# Patient Record
Sex: FEMALE | Race: BLACK OR AFRICAN AMERICAN | Employment: UNEMPLOYED | URBAN - METROPOLITAN AREA
[De-identification: names, ages, dates, MRNs, and addresses within clinical notes are randomized per-mention and may not be internally consistent; named-entity substitution may affect disease eponyms.]

---

## 2018-01-30 ENCOUNTER — HOSPITAL ENCOUNTER (EMERGENCY)
Age: 43
Discharge: HOME | End: 2018-01-30
Attending: EMERGENCY MEDICINE
Payer: COMMERCIAL

## 2018-01-30 VITALS
WEIGHT: 230 LBS | TEMPERATURE: 97.3 F | OXYGEN SATURATION: 98 % | HEART RATE: 89 BPM | DIASTOLIC BLOOD PRESSURE: 97 MMHG | RESPIRATION RATE: 14 BRPM | SYSTOLIC BLOOD PRESSURE: 151 MMHG

## 2018-01-30 DIAGNOSIS — K02.9 DENTAL CARIES: Primary | ICD-10-CM

## 2018-01-30 PROCEDURE — 6370000000 HC RX 637 (ALT 250 FOR IP): Performed by: EMERGENCY MEDICINE

## 2018-01-30 PROCEDURE — 99283 EMERGENCY DEPT VISIT LOW MDM: CPT

## 2018-01-30 RX ORDER — IBUPROFEN 800 MG/1
800 TABLET ORAL EVERY 8 HOURS PRN
Qty: 30 TABLET | Refills: 0 | Status: SHIPPED | OUTPATIENT
Start: 2018-01-30

## 2018-01-30 RX ORDER — IBUPROFEN 800 MG/1
800 TABLET ORAL ONCE
Status: COMPLETED | OUTPATIENT
Start: 2018-01-30 | End: 2018-01-30

## 2018-01-30 RX ORDER — CLINDAMYCIN HYDROCHLORIDE 150 MG/1
450 CAPSULE ORAL 3 TIMES DAILY
Qty: 90 CAPSULE | Refills: 0 | Status: SHIPPED | OUTPATIENT
Start: 2018-01-30 | End: 2018-02-09

## 2018-01-30 RX ORDER — CLINDAMYCIN HYDROCHLORIDE 150 MG/1
450 CAPSULE ORAL ONCE
Status: COMPLETED | OUTPATIENT
Start: 2018-01-30 | End: 2018-01-30

## 2018-01-30 RX ADMIN — CLINDAMYCIN HYDROCHLORIDE 450 MG: 150 CAPSULE ORAL at 09:44

## 2018-01-30 RX ADMIN — IBUPROFEN 800 MG: 800 TABLET, FILM COATED ORAL at 09:44

## 2018-01-30 ASSESSMENT — PAIN DESCRIPTION - LOCATION: LOCATION: FACE

## 2018-01-30 ASSESSMENT — ENCOUNTER SYMPTOMS
RHINORRHEA: 0
SHORTNESS OF BREATH: 0
TROUBLE SWALLOWING: 0
SINUS PAIN: 0
SORE THROAT: 0
SINUS PRESSURE: 0
FACIAL SWELLING: 1
VOICE CHANGE: 0
ABDOMINAL PAIN: 0

## 2018-01-30 ASSESSMENT — PAIN DESCRIPTION - PAIN TYPE: TYPE: ACUTE PAIN

## 2018-01-30 ASSESSMENT — PAIN DESCRIPTION - DESCRIPTORS: DESCRIPTORS: ACHING

## 2018-01-30 ASSESSMENT — PAIN SCALES - GENERAL
PAINLEVEL_OUTOF10: 4
PAINLEVEL_OUTOF10: 4

## 2018-01-30 ASSESSMENT — PAIN DESCRIPTION - ORIENTATION: ORIENTATION: LEFT

## 2018-01-30 ASSESSMENT — PAIN DESCRIPTION - ONSET: ONSET: SUDDEN

## 2018-01-30 NOTE — ED PROVIDER NOTES
No narrative on file       History reviewed. No pertinent family history. Allergies:  Penicillins    Home Medications:  Prior to Admission medications    Medication Sig Start Date End Date Taking? Authorizing Provider   clindamycin (CLEOCIN) 150 MG capsule Take 3 capsules by mouth 3 times daily for 10 days 1/30/18 2/9/18 Yes Irma Gong MD   ibuprofen (ADVIL;MOTRIN) 800 MG tablet Take 1 tablet by mouth every 8 hours as needed for Pain 1/30/18  Yes Irma Gong MD       REVIEW OF SYSTEMS    (2-9 systems for level 4, 10 or more for level 5)      Review of Systems   Constitutional: Negative for chills and fever. HENT: Positive for dental problem and facial swelling. Negative for congestion, drooling, rhinorrhea, sinus pain, sinus pressure, sore throat, trouble swallowing and voice change. Eyes: Negative for visual disturbance. Respiratory: Negative for shortness of breath. Cardiovascular: Negative for chest pain. Gastrointestinal: Negative for abdominal pain. Genitourinary: Negative for dysuria and frequency. Musculoskeletal: Negative for arthralgias. Skin: Negative for wound. Neurological: Negative for dizziness and light-headedness. PHYSICAL EXAM   (up to 7 for level 4, 8 or more for level 5)      INITIAL VITALS:   BP (!) 151/97   Pulse 89   Temp 97.3 °F (36.3 °C) (Oral)   Resp 14   Wt 230 lb (104.3 kg)   LMP 01/15/2018   SpO2 98%     Physical Exam   Constitutional: She is oriented to person, place, and time. No distress. HENT:   Head: Normocephalic and atraumatic. Evaluation of the oral cavity with swelling of the gum noted around teeth 14-16 with the 16th molar cracked, no fluctuance, no drainage, no signs of dental abscess. No stridor, drooling, no enlarged tonsils   Eyes: Right eye exhibits no discharge. Left eye exhibits no discharge. Cardiovascular: Normal rate, regular rhythm and normal heart sounds. Exam reveals no friction rub.     No murmur heard.  Pulmonary/Chest: Effort normal and breath sounds normal. No stridor. No respiratory distress. She has no wheezes. She has no rales. She exhibits no tenderness. Abdominal: Soft. She exhibits no distension. There is no tenderness. There is no guarding. Neurological: She is alert and oriented to person, place, and time. Skin: Skin is warm and dry. She is not diaphoretic. Nursing note and vitals reviewed. DIFFERENTIAL  DIAGNOSIS     PLAN (LABS / IMAGING / EKG):  No orders of the defined types were placed in this encounter. MEDICATIONS ORDERED:  Orders Placed This Encounter   Medications    clindamycin (CLEOCIN) capsule 450 mg    ibuprofen (ADVIL;MOTRIN) tablet 800 mg    clindamycin (CLEOCIN) 150 MG capsule     Sig: Take 3 capsules by mouth 3 times daily for 10 days     Dispense:  90 capsule     Refill:  0    ibuprofen (ADVIL;MOTRIN) 800 MG tablet     Sig: Take 1 tablet by mouth every 8 hours as needed for Pain     Dispense:  30 tablet     Refill:  0     DIAGNOSTIC RESULTS / EMERGENCY DEPARTMENT COURSE / MDM     LABS:  No results found for this visit on 01/30/18. EMERGENCY DEPARTMENT COURSE:    MDM: Patient with no signs of a dental abscess requiring drainage. We'll start on antibiotics and follow-up with scheduled dental appointment. I discussed his symptoms significantly worsen to return for further evaluation. PROCEDURES:  None    CONSULTS:  None    FINAL IMPRESSION      1.  Dental caries          DISPOSITION / PLAN     DISPOSITION Decision To Discharge 01/30/2018 09:41:33 AM      PATIENT REFERRED TO:  [facility] ED  [address] [place] [address]  [phone]    If symptoms worsen      DISCHARGE MEDICATIONS:  Discharge Medication List as of 1/30/2018  9:41 AM      START taking these medications    Details   clindamycin (CLEOCIN) 150 MG capsule Take 3 capsules by mouth 3 times daily for 10 days, Disp-90 capsule, R-0Print      ibuprofen (ADVIL;MOTRIN) 800 MG

## 2018-01-30 NOTE — ED NOTES
[facility]  [address]  Phone: [phone]  Fax: [phone]    Patient Appointment Information    To schedule an appointment at the [facility] for a patient please call:    [phone] for [address] / [phone] for Adults    Appointments for children are available the day the call is placed. Adult appointments are generally available within 48 to 72 hours. Information required making an appointment:    Amelia Noe  36 y.o. 1975 [phone] (home)    Chief Complaint   Patient presents with    Facial Swelling     pt awoke today with left side facial swelling. pt states that she has a hx of dental abscesses. pt has a dental appointment friday but states she was concerned waiting that long. Appointment day and time: Thursday February 1st at 09:45 am    Please as the patient to bring Medicaid card, Medicaid HMO card, or other insurance card. For self-pay, cost of emergency appointment is $38 for adults or $25 for children age 21 and under. The [facility] Center is not a free clinic and fees are due at time of service.       Signature:  Lisa Verduzco Date:  1/30/18     Peggy Medina RN  01/30/18 [id number]

## 2023-02-16 ENCOUNTER — HOSPITAL ENCOUNTER (INPATIENT)
Age: 48
LOS: 3 days | Discharge: HOME | DRG: 024 | End: 2023-02-20
Attending: EMERGENCY MEDICINE | Admitting: PSYCHIATRY & NEUROLOGY
Payer: COMMERCIAL

## 2023-02-16 ENCOUNTER — APPOINTMENT (OUTPATIENT)
Dept: CT IMAGING | Age: 48
DRG: 024 | End: 2023-02-16
Payer: COMMERCIAL

## 2023-02-16 DIAGNOSIS — I63.50 CEREBROVASCULAR ACCIDENT (CVA) DUE TO OCCLUSION OF CEREBRAL ARTERY (HCC): Primary | ICD-10-CM

## 2023-02-16 LAB
ANION GAP: 13 MMOL/L (ref 7–16)
EGFR, POC: >60 ML/MIN/1.73M2
GLUCOSE BLD-MCNC: 115 MG/DL (ref 74–100)
HCO3 VENOUS: 25.2 MMOL/L (ref 22–29)
NEGATIVE BASE EXCESS, VEN: 1 (ref 0–2)
O2 SAT, VEN: 32 % (ref 60–85)
PCO2, VEN: 45.5 MM HG (ref 41–51)
PH VENOUS: 7.35 (ref 7.32–7.43)
PO2, VEN: 21.1 MM HG (ref 30–50)
POC BUN: 25 MG/DL (ref 8–26)
POC CHLORIDE: 102 MMOL/L (ref 98–107)
POC CREATININE: 0.97 MG/DL (ref 0.51–1.19)
POC HEMATOCRIT: 42 % (ref 36–46)
POC HEMOGLOBIN: 14.3 G/DL (ref 12–16)
POC IONIZED CALCIUM: 1.2 MMOL/L (ref 1.15–1.33)
POC LACTIC ACID: 0.95 MMOL/L (ref 0.56–1.39)
POC POTASSIUM: 4.4 MMOL/L (ref 3.5–4.5)
POC SODIUM: 138 MMOL/L (ref 138–146)
POC TCO2: 25 MMOL/L (ref 22–30)

## 2023-02-16 PROCEDURE — 83874 ASSAY OF MYOGLOBIN: CPT

## 2023-02-16 PROCEDURE — 6360000004 HC RX CONTRAST MEDICATION: Performed by: STUDENT IN AN ORGANIZED HEALTH CARE EDUCATION/TRAINING PROGRAM

## 2023-02-16 PROCEDURE — 70498 CT ANGIOGRAPHY NECK: CPT

## 2023-02-16 PROCEDURE — 85730 THROMBOPLASTIN TIME PARTIAL: CPT

## 2023-02-16 PROCEDURE — 85610 PROTHROMBIN TIME: CPT

## 2023-02-16 PROCEDURE — 80048 BASIC METABOLIC PNL TOTAL CA: CPT

## 2023-02-16 PROCEDURE — 99285 EMERGENCY DEPT VISIT HI MDM: CPT

## 2023-02-16 PROCEDURE — 82553 CREATINE MB FRACTION: CPT

## 2023-02-16 PROCEDURE — 82565 ASSAY OF CREATININE: CPT

## 2023-02-16 PROCEDURE — 82803 BLOOD GASES ANY COMBINATION: CPT

## 2023-02-16 PROCEDURE — 82947 ASSAY GLUCOSE BLOOD QUANT: CPT

## 2023-02-16 PROCEDURE — 85014 HEMATOCRIT: CPT

## 2023-02-16 PROCEDURE — 84520 ASSAY OF UREA NITROGEN: CPT

## 2023-02-16 PROCEDURE — 83605 ASSAY OF LACTIC ACID: CPT

## 2023-02-16 PROCEDURE — 82330 ASSAY OF CALCIUM: CPT

## 2023-02-16 PROCEDURE — 80051 ELECTROLYTE PANEL: CPT

## 2023-02-16 PROCEDURE — 70450 CT HEAD/BRAIN W/O DYE: CPT

## 2023-02-16 PROCEDURE — 96374 THER/PROPH/DIAG INJ IV PUSH: CPT

## 2023-02-16 PROCEDURE — 82550 ASSAY OF CK (CPK): CPT

## 2023-02-16 PROCEDURE — 84484 ASSAY OF TROPONIN QUANT: CPT

## 2023-02-16 PROCEDURE — 85025 COMPLETE CBC W/AUTO DIFF WBC: CPT

## 2023-02-16 RX ORDER — ONDANSETRON 2 MG/ML
INJECTION INTRAMUSCULAR; INTRAVENOUS
Status: COMPLETED
Start: 2023-02-16 | End: 2023-02-17

## 2023-02-16 RX ORDER — ONDANSETRON 2 MG/ML
4 INJECTION INTRAMUSCULAR; INTRAVENOUS ONCE
Status: COMPLETED | OUTPATIENT
Start: 2023-02-17 | End: 2023-02-17

## 2023-02-16 RX ADMIN — IOPAMIDOL 90 ML: 755 INJECTION, SOLUTION INTRAVENOUS at 23:44

## 2023-02-17 ENCOUNTER — ANESTHESIA (OUTPATIENT)
Dept: INTERVENTIONAL RADIOLOGY/VASCULAR | Age: 48
DRG: 024 | End: 2023-02-17
Payer: COMMERCIAL

## 2023-02-17 ENCOUNTER — APPOINTMENT (OUTPATIENT)
Dept: INTERVENTIONAL RADIOLOGY/VASCULAR | Age: 48
DRG: 024 | End: 2023-02-17
Payer: COMMERCIAL

## 2023-02-17 ENCOUNTER — APPOINTMENT (OUTPATIENT)
Dept: GENERAL RADIOLOGY | Age: 48
DRG: 024 | End: 2023-02-17
Payer: COMMERCIAL

## 2023-02-17 ENCOUNTER — APPOINTMENT (OUTPATIENT)
Dept: MRI IMAGING | Age: 48
DRG: 024 | End: 2023-02-17
Payer: COMMERCIAL

## 2023-02-17 ENCOUNTER — ANESTHESIA EVENT (OUTPATIENT)
Dept: INTERVENTIONAL RADIOLOGY/VASCULAR | Age: 48
DRG: 024 | End: 2023-02-17
Payer: COMMERCIAL

## 2023-02-17 PROBLEM — I63.9 CEREBROVASCULAR ACCIDENT ABORTED BY ADMINISTRATION OF THROMBOLYTIC AGENT (HCC): Status: ACTIVE | Noted: 2023-02-17

## 2023-02-17 PROBLEM — I63.511 ACUTE STROKE DUE TO OCCLUSION OF RIGHT MIDDLE CEREBRAL ARTERY (HCC): Status: ACTIVE | Noted: 2023-02-17

## 2023-02-17 PROBLEM — I63.50 CEREBROVASCULAR ACCIDENT (CVA) DUE TO OCCLUSION OF CEREBRAL ARTERY (HCC): Status: ACTIVE | Noted: 2023-02-17

## 2023-02-17 LAB
ABSOLUTE EOS #: 0.49 K/UL (ref 0–0.44)
ABSOLUTE IMMATURE GRANULOCYTE: <0.03 K/UL (ref 0–0.3)
ABSOLUTE LYMPH #: 2.3 K/UL (ref 1.1–3.7)
ABSOLUTE MONO #: 0.76 K/UL (ref 0.1–1.2)
ANION GAP SERPL CALCULATED.3IONS-SCNC: 14 MMOL/L (ref 9–17)
BASOPHILS # BLD: 1 % (ref 0–2)
BASOPHILS ABSOLUTE: 0.04 K/UL (ref 0–0.2)
BUN SERPL-MCNC: 21 MG/DL (ref 6–20)
CALCIUM SERPL-MCNC: 8.8 MG/DL (ref 8.6–10.4)
CHLORIDE SERPL-SCNC: 101 MMOL/L (ref 98–107)
CHOLEST SERPL-MCNC: 159 MG/DL
CHOLESTEROL/HDL RATIO: 4.1
CK SERPL-CCNC: 37 U/L (ref 26–192)
CO2 SERPL-SCNC: 20 MMOL/L (ref 20–31)
CREAT SERPL-MCNC: 0.97 MG/DL (ref 0.5–0.9)
EKG ATRIAL RATE: 100 BPM
EKG ATRIAL RATE: 98 BPM
EKG P AXIS: 57 DEGREES
EKG P AXIS: 69 DEGREES
EKG P-R INTERVAL: 144 MS
EKG P-R INTERVAL: 148 MS
EKG Q-T INTERVAL: 382 MS
EKG Q-T INTERVAL: 386 MS
EKG QRS DURATION: 106 MS
EKG QRS DURATION: 110 MS
EKG QTC CALCULATION (BAZETT): 487 MS
EKG QTC CALCULATION (BAZETT): 497 MS
EKG R AXIS: -3 DEGREES
EKG R AXIS: 5 DEGREES
EKG T AXIS: -56 DEGREES
EKG T AXIS: -82 DEGREES
EKG VENTRICULAR RATE: 100 BPM
EKG VENTRICULAR RATE: 98 BPM
EOSINOPHILS RELATIVE PERCENT: 6 % (ref 1–4)
EST. AVERAGE GLUCOSE BLD GHB EST-MCNC: 120 MG/DL
GFR SERPL CREATININE-BSD FRML MDRD: >60 ML/MIN/1.73M2
GLUCOSE SERPL-MCNC: 119 MG/DL (ref 70–99)
HBA1C MFR BLD: 5.8 % (ref 4–6)
HCT VFR BLD AUTO: 38.3 % (ref 36.3–47.1)
HCT VFR BLD AUTO: 38.9 % (ref 36.3–47.1)
HDLC SERPL-MCNC: 39 MG/DL
HGB BLD-MCNC: 12.4 G/DL (ref 11.9–15.1)
HGB BLD-MCNC: 12.6 G/DL (ref 11.9–15.1)
IMMATURE GRANULOCYTES: 0 %
INR PPP: 1
LDLC SERPL CALC-MCNC: 94 MG/DL (ref 0–130)
LYMPHOCYTES # BLD: 28 % (ref 24–43)
MCH RBC QN AUTO: 28 PG (ref 25.2–33.5)
MCH RBC QN AUTO: 28.1 PG (ref 25.2–33.5)
MCHC RBC AUTO-ENTMCNC: 32.4 G/DL (ref 28.4–34.8)
MCHC RBC AUTO-ENTMCNC: 32.4 G/DL (ref 28.4–34.8)
MCV RBC AUTO: 86.5 FL (ref 82.6–102.9)
MCV RBC AUTO: 86.6 FL (ref 82.6–102.9)
MONOCYTES # BLD: 9 % (ref 3–12)
MYOGLOBIN SERPL-MCNC: 23 NG/ML (ref 25–58)
NRBC AUTOMATED: 0 PER 100 WBC
NRBC AUTOMATED: 0 PER 100 WBC
PARTIAL THROMBOPLASTIN TIME: 22.6 SEC (ref 20.5–30.5)
PDW BLD-RTO: 15.5 % (ref 11.8–14.4)
PDW BLD-RTO: 15.6 % (ref 11.8–14.4)
PLATELET # BLD AUTO: 301 K/UL (ref 138–453)
PLATELET # BLD AUTO: 341 K/UL (ref 138–453)
PMV BLD AUTO: 9.7 FL (ref 8.1–13.5)
PMV BLD AUTO: 9.7 FL (ref 8.1–13.5)
POTASSIUM SERPL-SCNC: 4.5 MMOL/L (ref 3.7–5.3)
PROTHROMBIN TIME: 10.4 SEC (ref 9.1–12.3)
RBC # BLD: 4.43 M/UL (ref 3.95–5.11)
RBC # BLD: 4.49 M/UL (ref 3.95–5.11)
RBC # BLD: ABNORMAL 10*6/UL
REASON FOR REJECTION: NORMAL
SEG NEUTROPHILS: 56 % (ref 36–65)
SEGMENTED NEUTROPHILS ABSOLUTE COUNT: 4.69 K/UL (ref 1.5–8.1)
SODIUM SERPL-SCNC: 135 MMOL/L (ref 135–144)
TRIGL SERPL-MCNC: 131 MG/DL
TROPONIN I SERPL DL<=0.01 NG/ML-MCNC: 12 NG/L (ref 0–14)
WBC # BLD AUTO: 8.3 K/UL (ref 3.5–11.3)
WBC # BLD AUTO: 9.8 K/UL (ref 3.5–11.3)
ZZ NTE CLEAN UP: ORDERED TEST: NORMAL
ZZ NTE WITH NAME CLEAN UP: SPECIMEN SOURCE: NORMAL

## 2023-02-17 PROCEDURE — C1760 CLOSURE DEV, VASC: HCPCS

## 2023-02-17 PROCEDURE — 93010 ELECTROCARDIOGRAM REPORT: CPT | Performed by: INTERNAL MEDICINE

## 2023-02-17 PROCEDURE — 80061 LIPID PANEL: CPT

## 2023-02-17 PROCEDURE — 6360000002 HC RX W HCPCS

## 2023-02-17 PROCEDURE — C1757 CATH, THROMBECTOMY/EMBOLECT: HCPCS

## 2023-02-17 PROCEDURE — 93005 ELECTROCARDIOGRAM TRACING: CPT | Performed by: STUDENT IN AN ORGANIZED HEALTH CARE EDUCATION/TRAINING PROGRAM

## 2023-02-17 PROCEDURE — 71045 X-RAY EXAM CHEST 1 VIEW: CPT

## 2023-02-17 PROCEDURE — 6370000000 HC RX 637 (ALT 250 FOR IP): Performed by: NURSE PRACTITIONER

## 2023-02-17 PROCEDURE — C1887 CATHETER, GUIDING: HCPCS

## 2023-02-17 PROCEDURE — 2580000003 HC RX 258: Performed by: SPECIALIST

## 2023-02-17 PROCEDURE — 6360000002 HC RX W HCPCS: Performed by: SPECIALIST

## 2023-02-17 PROCEDURE — 6360000004 HC RX CONTRAST MEDICATION: Performed by: PSYCHIATRY & NEUROLOGY

## 2023-02-17 PROCEDURE — 6370000000 HC RX 637 (ALT 250 FOR IP): Performed by: STUDENT IN AN ORGANIZED HEALTH CARE EDUCATION/TRAINING PROGRAM

## 2023-02-17 PROCEDURE — 70547 MR ANGIOGRAPHY NECK W/O DYE: CPT

## 2023-02-17 PROCEDURE — 83036 HEMOGLOBIN GLYCOSYLATED A1C: CPT

## 2023-02-17 PROCEDURE — 92523 SPEECH SOUND LANG COMPREHEN: CPT

## 2023-02-17 PROCEDURE — 03CG3Z7 EXTIRPATION OF MATTER FROM INTRACRANIAL ARTERY USING STENT RETRIEVER, PERCUTANEOUS APPROACH: ICD-10-PCS | Performed by: PSYCHIATRY & NEUROLOGY

## 2023-02-17 PROCEDURE — 6360000002 HC RX W HCPCS: Performed by: STUDENT IN AN ORGANIZED HEALTH CARE EDUCATION/TRAINING PROGRAM

## 2023-02-17 PROCEDURE — 2580000003 HC RX 258: Performed by: STUDENT IN AN ORGANIZED HEALTH CARE EDUCATION/TRAINING PROGRAM

## 2023-02-17 PROCEDURE — 2709999900 HC NON-CHARGEABLE SUPPLY

## 2023-02-17 PROCEDURE — 93005 ELECTROCARDIOGRAM TRACING: CPT | Performed by: PSYCHIATRY & NEUROLOGY

## 2023-02-17 PROCEDURE — 36415 COLL VENOUS BLD VENIPUNCTURE: CPT

## 2023-02-17 PROCEDURE — C1894 INTRO/SHEATH, NON-LASER: HCPCS

## 2023-02-17 PROCEDURE — 99223 1ST HOSP IP/OBS HIGH 75: CPT | Performed by: PSYCHIATRY & NEUROLOGY

## 2023-02-17 PROCEDURE — 85027 COMPLETE CBC AUTOMATED: CPT

## 2023-02-17 PROCEDURE — 61645 PERQ ART M-THROMBECT &/NFS: CPT | Performed by: PSYCHIATRY & NEUROLOGY

## 2023-02-17 PROCEDURE — 2000000000 HC ICU R&B

## 2023-02-17 PROCEDURE — C1769 GUIDE WIRE: HCPCS

## 2023-02-17 PROCEDURE — 99291 CRITICAL CARE FIRST HOUR: CPT | Performed by: PSYCHIATRY & NEUROLOGY

## 2023-02-17 PROCEDURE — 70551 MRI BRAIN STEM W/O DYE: CPT

## 2023-02-17 PROCEDURE — 3E03317 INTRODUCTION OF OTHER THROMBOLYTIC INTO PERIPHERAL VEIN, PERCUTANEOUS APPROACH: ICD-10-PCS | Performed by: PSYCHIATRY & NEUROLOGY

## 2023-02-17 PROCEDURE — 2500000003 HC RX 250 WO HCPCS: Performed by: SPECIALIST

## 2023-02-17 PROCEDURE — 99221 1ST HOSP IP/OBS SF/LOW 40: CPT | Performed by: PHYSICAL MEDICINE & REHABILITATION

## 2023-02-17 PROCEDURE — 6370000000 HC RX 637 (ALT 250 FOR IP)

## 2023-02-17 PROCEDURE — 61645 PERQ ART M-THROMBECT &/NFS: CPT

## 2023-02-17 RX ORDER — ATORVASTATIN CALCIUM 80 MG/1
80 TABLET, FILM COATED ORAL NIGHTLY
Status: DISCONTINUED | OUTPATIENT
Start: 2023-02-17 | End: 2023-02-20 | Stop reason: HOSPADM

## 2023-02-17 RX ORDER — SODIUM CHLORIDE 0.9 % (FLUSH) 0.9 %
5-40 SYRINGE (ML) INJECTION PRN
Status: DISCONTINUED | OUTPATIENT
Start: 2023-02-17 | End: 2023-02-20 | Stop reason: HOSPADM

## 2023-02-17 RX ORDER — SODIUM CHLORIDE 9 MG/ML
INJECTION, SOLUTION INTRAVENOUS CONTINUOUS
Status: DISCONTINUED | OUTPATIENT
Start: 2023-02-17 | End: 2023-02-17

## 2023-02-17 RX ORDER — POLYETHYLENE GLYCOL 3350 17 G/17G
17 POWDER, FOR SOLUTION ORAL DAILY PRN
Status: DISCONTINUED | OUTPATIENT
Start: 2023-02-17 | End: 2023-02-20 | Stop reason: HOSPADM

## 2023-02-17 RX ORDER — CARVEDILOL 3.12 MG/1
3.12 TABLET ORAL 2 TIMES DAILY WITH MEALS
Status: DISCONTINUED | OUTPATIENT
Start: 2023-02-17 | End: 2023-02-20 | Stop reason: HOSPADM

## 2023-02-17 RX ORDER — MAGNESIUM OXIDE 400 MG/1
400 TABLET ORAL DAILY
COMMUNITY
Start: 2023-02-16

## 2023-02-17 RX ORDER — ALBUTEROL SULFATE 90 UG/1
2 AEROSOL, METERED RESPIRATORY (INHALATION) EVERY 6 HOURS PRN
COMMUNITY
Start: 2022-04-22

## 2023-02-17 RX ORDER — ONDANSETRON 4 MG/1
4 TABLET, ORALLY DISINTEGRATING ORAL EVERY 8 HOURS PRN
Status: DISCONTINUED | OUTPATIENT
Start: 2023-02-17 | End: 2023-02-20 | Stop reason: HOSPADM

## 2023-02-17 RX ORDER — ASPIRIN 300 MG/1
300 SUPPOSITORY RECTAL DAILY
Status: DISCONTINUED | OUTPATIENT
Start: 2023-02-18 | End: 2023-02-17

## 2023-02-17 RX ORDER — SPIRONOLACTONE 25 MG/1
25 TABLET ORAL DAILY
Status: DISCONTINUED | OUTPATIENT
Start: 2023-02-17 | End: 2023-02-20 | Stop reason: HOSPADM

## 2023-02-17 RX ORDER — MIDAZOLAM HYDROCHLORIDE 1 MG/ML
INJECTION INTRAMUSCULAR; INTRAVENOUS PRN
Status: DISCONTINUED | OUTPATIENT
Start: 2023-02-17 | End: 2023-02-17 | Stop reason: SDUPTHER

## 2023-02-17 RX ORDER — ENOXAPARIN SODIUM 100 MG/ML
40 INJECTION SUBCUTANEOUS DAILY
Status: DISCONTINUED | OUTPATIENT
Start: 2023-02-18 | End: 2023-02-17

## 2023-02-17 RX ORDER — ONDANSETRON 4 MG/1
4 TABLET, ORALLY DISINTEGRATING ORAL EVERY 8 HOURS PRN
Status: DISCONTINUED | OUTPATIENT
Start: 2023-02-17 | End: 2023-02-17 | Stop reason: SDUPTHER

## 2023-02-17 RX ORDER — ASPIRIN 81 MG/1
81 TABLET ORAL DAILY
Status: DISCONTINUED | OUTPATIENT
Start: 2023-02-18 | End: 2023-02-17

## 2023-02-17 RX ORDER — CLINDAMYCIN PHOSPHATE 600 MG/50ML
INJECTION INTRAVENOUS PRN
Status: DISCONTINUED | OUTPATIENT
Start: 2023-02-17 | End: 2023-02-17 | Stop reason: SDUPTHER

## 2023-02-17 RX ORDER — ONDANSETRON 2 MG/ML
4 INJECTION INTRAMUSCULAR; INTRAVENOUS EVERY 6 HOURS PRN
Status: DISCONTINUED | OUTPATIENT
Start: 2023-02-17 | End: 2023-02-20 | Stop reason: HOSPADM

## 2023-02-17 RX ORDER — IODIXANOL 320 MG/ML
100 INJECTION, SOLUTION INTRAVASCULAR
Status: COMPLETED | OUTPATIENT
Start: 2023-02-17 | End: 2023-02-17

## 2023-02-17 RX ORDER — ONDANSETRON 2 MG/ML
4 INJECTION INTRAMUSCULAR; INTRAVENOUS EVERY 6 HOURS PRN
Status: DISCONTINUED | OUTPATIENT
Start: 2023-02-17 | End: 2023-02-17 | Stop reason: SDUPTHER

## 2023-02-17 RX ORDER — FENTANYL CITRATE 50 UG/ML
INJECTION, SOLUTION INTRAMUSCULAR; INTRAVENOUS PRN
Status: DISCONTINUED | OUTPATIENT
Start: 2023-02-17 | End: 2023-02-17 | Stop reason: SDUPTHER

## 2023-02-17 RX ORDER — SODIUM CHLORIDE 9 MG/ML
INJECTION, SOLUTION INTRAVENOUS PRN
Status: DISCONTINUED | OUTPATIENT
Start: 2023-02-17 | End: 2023-02-20 | Stop reason: HOSPADM

## 2023-02-17 RX ORDER — SPIRONOLACTONE 25 MG/1
25 TABLET ORAL DAILY
COMMUNITY
Start: 2022-02-04

## 2023-02-17 RX ORDER — ASPIRIN 81 MG/1
81 TABLET, CHEWABLE ORAL DAILY
Status: DISCONTINUED | OUTPATIENT
Start: 2023-02-18 | End: 2023-02-20 | Stop reason: HOSPADM

## 2023-02-17 RX ORDER — FUROSEMIDE 40 MG/1
40 TABLET ORAL DAILY
COMMUNITY
Start: 2022-09-05

## 2023-02-17 RX ORDER — SODIUM CHLORIDE 9 MG/ML
INJECTION, SOLUTION INTRAVENOUS CONTINUOUS PRN
Status: DISCONTINUED | OUTPATIENT
Start: 2023-02-17 | End: 2023-02-17 | Stop reason: SDUPTHER

## 2023-02-17 RX ORDER — 0.9 % SODIUM CHLORIDE 0.9 %
500 INTRAVENOUS SOLUTION INTRAVENOUS ONCE
Status: DISCONTINUED | OUTPATIENT
Start: 2023-02-17 | End: 2023-02-17

## 2023-02-17 RX ORDER — CARVEDILOL 3.12 MG/1
3.12 TABLET ORAL 2 TIMES DAILY
COMMUNITY

## 2023-02-17 RX ORDER — LABETALOL HYDROCHLORIDE 5 MG/ML
10 INJECTION, SOLUTION INTRAVENOUS EVERY 10 MIN PRN
Status: DISCONTINUED | OUTPATIENT
Start: 2023-02-17 | End: 2023-02-17

## 2023-02-17 RX ORDER — ACETAMINOPHEN 325 MG/1
650 TABLET ORAL EVERY 4 HOURS PRN
Status: DISCONTINUED | OUTPATIENT
Start: 2023-02-17 | End: 2023-02-20 | Stop reason: HOSPADM

## 2023-02-17 RX ORDER — SODIUM CHLORIDE 0.9 % (FLUSH) 0.9 %
5-40 SYRINGE (ML) INJECTION EVERY 12 HOURS SCHEDULED
Status: DISCONTINUED | OUTPATIENT
Start: 2023-02-17 | End: 2023-02-20 | Stop reason: HOSPADM

## 2023-02-17 RX ORDER — ALBUTEROL SULFATE 2.5 MG/3ML
2.5 SOLUTION RESPIRATORY (INHALATION) EVERY 6 HOURS
COMMUNITY
Start: 2023-01-28

## 2023-02-17 RX ORDER — FUROSEMIDE 20 MG/1
20 TABLET ORAL DAILY
Status: DISCONTINUED | OUTPATIENT
Start: 2023-02-17 | End: 2023-02-20 | Stop reason: HOSPADM

## 2023-02-17 RX ADMIN — SODIUM CHLORIDE: 9 INJECTION, SOLUTION INTRAVENOUS at 03:20

## 2023-02-17 RX ADMIN — CLINDAMYCIN PHOSPHATE 600 MG: 600 INJECTION, SOLUTION INTRAVENOUS at 03:36

## 2023-02-17 RX ADMIN — FUROSEMIDE 20 MG: 20 TABLET ORAL at 14:57

## 2023-02-17 RX ADMIN — MIDAZOLAM 1 MG: 1 INJECTION INTRAMUSCULAR; INTRAVENOUS at 03:36

## 2023-02-17 RX ADMIN — SODIUM CHLORIDE, PRESERVATIVE FREE 10 ML: 5 INJECTION INTRAVENOUS at 20:37

## 2023-02-17 RX ADMIN — SPIRONOLACTONE 25 MG: 25 TABLET ORAL at 19:09

## 2023-02-17 RX ADMIN — TENECTEPLASE 22 MG: KIT at 00:42

## 2023-02-17 RX ADMIN — ONDANSETRON 4 MG: 2 INJECTION INTRAMUSCULAR; INTRAVENOUS at 00:01

## 2023-02-17 RX ADMIN — ATORVASTATIN CALCIUM 80 MG: 80 TABLET, FILM COATED ORAL at 20:36

## 2023-02-17 RX ADMIN — FENTANYL CITRATE 50 MCG: 50 INJECTION, SOLUTION INTRAMUSCULAR; INTRAVENOUS at 04:19

## 2023-02-17 RX ADMIN — ASPIRIN 325 MG: 325 TABLET, COATED ORAL at 14:57

## 2023-02-17 RX ADMIN — CARVEDILOL 3.12 MG: 3.12 TABLET, FILM COATED ORAL at 19:09

## 2023-02-17 RX ADMIN — IODIXANOL 74 ML: 320 INJECTION, SOLUTION INTRAVASCULAR at 04:41

## 2023-02-17 RX ADMIN — PHENYLEPHRINE HYDROCHLORIDE 15 MCG/MIN: 10 INJECTION INTRAVENOUS at 01:48

## 2023-02-17 RX ADMIN — ACETAMINOPHEN 650 MG: 325 TABLET ORAL at 14:57

## 2023-02-17 RX ADMIN — FENTANYL CITRATE 50 MCG: 50 INJECTION, SOLUTION INTRAMUSCULAR; INTRAVENOUS at 03:36

## 2023-02-17 RX ADMIN — ACETAMINOPHEN 650 MG: 325 TABLET ORAL at 06:33

## 2023-02-17 RX ADMIN — SODIUM CHLORIDE, PRESERVATIVE FREE 10 ML: 5 INJECTION INTRAVENOUS at 08:16

## 2023-02-17 RX ADMIN — SODIUM CHLORIDE: 9 INJECTION, SOLUTION INTRAVENOUS at 05:56

## 2023-02-17 ASSESSMENT — PAIN DESCRIPTION - DESCRIPTORS: DESCRIPTORS: ACHING

## 2023-02-17 ASSESSMENT — PAIN SCALES - GENERAL
PAINLEVEL_OUTOF10: 6
PAINLEVEL_OUTOF10: 0

## 2023-02-17 ASSESSMENT — ENCOUNTER SYMPTOMS
EYE REDNESS: 0
SHORTNESS OF BREATH: 0
ABDOMINAL PAIN: 0
RHINORRHEA: 0

## 2023-02-17 ASSESSMENT — PAIN DESCRIPTION - ORIENTATION: ORIENTATION: ANTERIOR

## 2023-02-17 ASSESSMENT — PAIN DESCRIPTION - LOCATION: LOCATION: HEAD

## 2023-02-17 NOTE — PROGRESS NOTES
Reviewed MRI brain, MRA neck with Dr. Jasper Berrios, will load with Aspirin 325 mg once today and continue with 81 mg daily starting tomorrow.

## 2023-02-17 NOTE — BRIEF OP NOTE
Brief Postoperative Note                    [facility]    NEUROENDOVASCULAR SERVICE: POST-OP NOTE: 2/17/2023    Pt Name: Marifer Juares  MRN: [id number]  [place]: 1975  Date of Procedure: 2/17/2023  Primary Care Physician: No primary care provider on file. Pre-Procedural Diagnosis:Right MCA M-1 occlusion s/p IV TNK  Post-Procedural Diagnosis:Right MCA M-2 inferior division occlusion       Procedure Performed:Cerebral angiogram with mechanical thrombectomy of the Right MCA inferior division M-2 division     Surgeon:   Monica Solis MD    Fellow:  Heidi Ascencio MD     Assisting Tech:  Kyleigh Marie    PRE-PROCEDURAL EXAM:  Prestroke baseline mRS MODIFIED ANNA SCORE: 0 - No symptoms at all. Neurological exam performed and unchanged from initial H&P or consult  CT head ASPECT score: 10    Anesthesia: MAC anesthesia  Complications: none    Intra-Operative EXAM:  Neurological exam performed and unchanged from initial H&P or consult    EBL: < less than 50       Cc            Specimens: Were not Obtained  Contrast:     Visipaque 320 low osmolar 74 Cc             Fluoro: 21.9 min    Findings:  Please see dictated Radiology note for further details  Right MCA M-2 inferior division occlusion, TICI score 0   The above lesion was treated 8 fr short sheath, BGC, vamsi aspiration x 1, Trevo stent retriever and aspiration through [address] x 1, 2 passes  Final TICI score 03   Non-flow limiting focal dissection in the right mid-cervical ICA         POST-PROCEDURAL EXAM :   Stable neurological Exam  Neurological exam performed and unchanged from initial H&P or consult    Closure:  right Angioseal 8   F        POST-PROCEDURAL MONITORING : see orders  Disposition: Neuro ICU      Recommendations:  Back to Neuro ICU  Do not bend right leg for 3 hours. Groin checks per protocol. Peripheral pulse checks per protocol.   SBP goal  mm Hg   Cardio-embolic work up - known cardiomyopathy, last LVEF 15-20%  Obtain MRA neck with MRI brain   Follow up with Elizabeth Harrington MD  2 weeks after discharge and Dr. Diego Gamble 3 months after discharge.         MD Carlos Montes De Oca MD   Pager [phone]  Stroke, [facility]  [address] [place]  Electronically signed 2/17/2023 at 4:58 AM

## 2023-02-17 NOTE — PROGRESS NOTES
Patient was evaluated at the bedside. Patient has left face weakness, dysarthria. NIH Stroke Scale:   1a  Level of consciousness: 0 - alert; keenly responsive   1b. LOC questions:  0 - answers both questions correctly   1c. LOC commands: 0 - performs both tasks correctly   2. Best Gaze: 0 - normal   3. Visual: 1 - partial hemianopia   4. Facial Palsy: 2 - partial paralysis (total or near total paralysis of the lower face)   5a. Motor left arm: 0 - no drift, limb holds 90 (or 45) degrees for full 10 seconds   5b. Motor right arm: 0 - no drift, limb holds 90 (or 45) degrees for full 10 seconds   6a. Motor left le - no drift; leg holds 30 degree position for full 5 seconds   6b  Motor right le - no drift; leg holds 30 degree position for full 5 seconds   7. Limb Ataxia: 0 - absent   8. Sensory: 0 - normal; no sensory loss   9. Best Language:  0 - no aphasia, normal   10. Dysarthria: 1 - mild to moderate, patient slurs at least some words and at worst, can be understood with some difficulty   11. Extinction and Inattention: 1 - visual, tactile, auditory, spatial or personal inattention or extinction to bilateral simultaneous stimulation in one of the sensory modalities          Total:   5       NIHSS 5     Patient is about to receive tNK  Patient was found to have Right MCA M-1 occlusion on CTA head. Patient is recommended to have emergent mechanical thrombectomy. At this time, patient is refusing mechanical thrombectomy after explaining the risks and benefits.    We will re assess the patient again     Governmaryuri Bocanegra MD     Stroke, [facility]  [address] [place]

## 2023-02-17 NOTE — CODE DOCUMENTATION
Pt came to ED via LS1 w complaint of CVA w L sided facial palsy. Race of 1. Pt had a GCS of 15 per EMS. Pt had a recent hospitalization at [facility] for a cardiac related stay. Pt was AOx4. - blood thinner use. - previous stroke hx. LKW 2230 per EMS run sheet.

## 2023-02-17 NOTE — CODE DOCUMENTATION
Pt arrived, RACE alert taken straight to CT scanner from ambulance bay.  Neuro resident at bedside, Dr. Maria G Nicolas, Dr Brandy Ewing, and Writer

## 2023-02-17 NOTE — PROGRESS NOTES
Endovascular Neurosurgery Progress Note    SUBJECTIVE:     Pt feels good, no complaints after successful thrombectomy    Review of Systems:  CONSTITUTIONAL:  negative for fevers, chills, fatigue and malaise    EYES:  negative for double vision, blurred vision and photophobia     HEENT:  negative for tinnitus, epistaxis and sore throat    RESPIRATORY:  negative for cough, shortness of breath, wheezing    CARDIOVASCULAR:  negative for chest pain, palpitations, syncope, edema    GASTROINTESTINAL:  negative for nausea, vomiting    GENITOURINARY:  negative for incontinence    MUSCULOSKELETAL:  negative for neck or back pain    NEUROLOGICAL:  Negative for weakness and tingling  negative for headaches and dizziness    PSYCHIATRIC:  negative for anxiety      Review of systems otherwise negative. OBJECTIVE:     Vitals:    02/17/23 0930   BP: 102/71   Pulse: 79   Resp: 19   Temp:    SpO2:         General:  Gen: normal habitus, NAD  HEENT: NCAT, mucosa moist  Cvs: RRR, S1 S2 normal  Resp: symmetric unlabored breathing  Abd: s/nd/nt  Ext: no edema  Skin: no lesions seen, warm and dry    Neuro:  Gen: awake and alert, oriented x3. Lang/speech: no aphasia or dysarthria. Follows commands. CN: PERRL, EOMI, VFF, V1-3 intact, mild left face droop, hearing intact, shoulder shrug symmetric, tongue midline  Motor: grossly 5/5 UE and LE b/l  Sense: LT intact in all 4 ext. Coord: FTN and HTS intact b/l  DTR: deferred  Gait: deferred    NIH Stroke Scale:   1a  Level of consciousness: 0 - alert; keenly responsive   1b. LOC questions:  0 - answers both questions correctly   1c. LOC commands: 0 - performs both tasks correctly   2. Best Gaze: 0 - normal   3. Visual: 0 - no visual loss   4. Facial Palsy: 1 - minor paralysis (flattened nasolabial fold, asymmetric on smiling)   5a. Motor left arm: 0 - no drift, limb holds 90 (or 45) degrees for full 10 seconds   5b.   Motor right arm: 0 - no drift, limb holds 90 (or 45) degrees for full 10 seconds   6a. Motor left le - no drift; leg holds 30 degree position for full 5 seconds   6b  Motor right le - no drift; leg holds 30 degree position for full 5 seconds   7. Limb Ataxia: 0 - absent   8. Sensory: 0 - normal; no sensory loss   9. Best Language:  0 - no aphasia, normal   10. Dysarthria: 0 - normal   11. Extinction and Inattention: 0 - no abnormality         Total:   1     MRS: 1      LABS:   Reviewed.   Lab Results   Component Value Date    HGB 12.4 2023    WBC 9.8 2023     2023     2023    BUN 21 (H) 2023    CREATININE 0.97 (H) 2023    APTT 22.6 2023    INR 1.0 2023      No results found for: COVID19    RADIOLOGY:   Images were personally reviewed including:  Please see dictated Radiology note for further details  Right MCA M-2 inferior division occlusion, TICI score 0   The above lesion was treated 8 fr short sheath, BGC, vamsi aspiration x 1, Trevo stent retriever and aspiration through [address] x 1, 2 passes  Final TICI score 03   Non-flow limiting focal dissection in the right mid-cervical ICA       MRA neck on the 23: R ICA cervical segment small dissection    ASSESSMENT:     52 yo woman with CHF EF of 15%, severe mitral regurgitation, p/w acute left face droop and confusion found to have R ICA cervical mild stenosis/dissection, acute R M1 occlusion, s/p TNK and successful thrombectomy    PLAN:     - doing very well  - stroke is cardioembolic, most likely due to her very low EF of 15% CHF  - patient will need a MORRIS to r/o thrombus, if MORRIS is negative will get hypercoagulable workup as outpatient  - consult cardiology for MORRIS and CHF optimization  - avoid pressor, SBP in the  is good as long as she is clinically stable and intact  - there is a small dissection that is not hemodynamically significant at the R ICA mid cervical segment, MRI is done and does not show any bleed, she is still within the TNK 24 hours window, however given this finding the benefits outweigh the risk, will start her on aspirin 325mg x 1 dose follow by 81mg daily  - if patient is found to have any thrombus, aspirin can be discontinued and replaced with anticoagulant    - f/u with Dr. Marly Ortiz in 2 weeks and Dr Valerie Collier in 3 months    Case discussed with Dr. Valerie Collier attending.     Kevin Villatoro MD  Stroke, [facility]  [address] [place]  Electronically signed 2/17/2023 at 9:57 AM

## 2023-02-17 NOTE — ED PROVIDER NOTES
[facility]   Emergency Department  Faculty Attestation       I performed a history and physical examination of the patient and discussed management with the resident. I reviewed the residents note and agree with the documented findings including all diagnostic interpretations and plan of care. Any areas of disagreement are noted on the chart. I was personally present for the key portions of any procedures. I have documented in the chart those procedures where I was not present during the key portions. I have reviewed the emergency nurses triage note. I agree with the chief complaint, past medical history, past surgical history, allergies, medications, social and family history as documented unless otherwise noted below. Documentation of the HPI, Physical Exam and Medical Decision Making performed by scribjuan j is based on my personal performance of the HPI, PE and MDM. For Physician Assistant/ Nurse Practitioner cases/documentation I have personally evaluated this patient and have completed at least one if not all key elements of the E/M (history, physical exam, and MDM). Additional findings are as noted. Pertinent Comments     Primary Care Physician: No primary care provider on file. ED Triage Vitals   BP Temp Temp Source Heart Rate Resp SpO2 Height Weight   02/16/23 2340 02/17/23 0000 02/17/23 0057 02/16/23 2340 02/16/23 2340 02/16/23 2340 02/17/23 0030 02/17/23 0030   97/73 98.4 °F (36.9 °C) Oral 84 22 97 % 5' 9\" (1.753 m) 194 lb (88 kg)        This is a 51 y.o. female who presents to the Emergency Department with initial complaints of left-sided weakness, but now presenting with left-sided facial droop and some dysarthria. Brought in by EMS. Patient symptoms started approximately 10:30 PM.  Patient does have a significant past medical history of CHF and has an EF of approximate 19%. Was just discharged from cardiac ICU at [facility] the 15th. Patient denies any falls.   Is not on any blood thinners. .    LKW: 10:30 PM    On exam awake and answering questions. She is normocephalic atraumatic. Does have a left-sided facial droop. Pupils equal and reactive with normal extraocular eye movements. Heart sounds regular lungs auscultation bilaterally with no rales rhonchi's or wheezes. Abdomen soft nontender nondistended. No significant pitting edema in the extremities. No drift of the extremities or limb ataxia. But does have some dysarthria. No aphasia. Is alert and oriented x4. See NIH stroke scale below. INITIAL NIH STROKE SCALE    Time Performed:  11:37 PM     1a. Level of consciousness:  0 - alert; keenly responsive  1b. Level of consciousness questions:  0 - answers both questions correctly  1c. Level of consciousness questions:  0 - performs both tasks correctly  2. Best Gaze:  0 - normal  3. Visual:  0 - no visual loss  4. Facial Palsy:  1 - minor paralysis (flattened nasolabial fold, asymmetric on smiling)  5a. Motor left arm:  0 - no drift, limb holds 90 (or 45) degrees for full 10 seconds  5b. Motor right arm:  0 - no drift, limb holds 90 (or 45) degrees for full 10 seconds  6a. Motor left le - no drift; leg holds 30 degree position for full 5 seconds  6b. Motor right le - no drift; leg holds 30 degree position for full 5 seconds  7. Limb Ataxia:  0 - absent  8. Sensory:  0 - normal; no sensory loss  9. Best Language:  0 - no aphasia, normal  10. Dysarthria:  1 - mild to moderate, patient slurs at least some words and at worst, can be understood with some difficulty  11. Extinction and Inattention:  0 - no abnormality    TOTAL:  2    Medical Decision Making  Patient was called as a race alert based off of the initial symptoms for first responders and symptoms by EMS. Taken immediately to the CT scanner from ambulance bay.   Myself and resident physician met the patient in the CT scanner and quickly evaluated while IVs are being placed and patient was being moved over to the scanner. Found to have an [place] 47 of 2. CT head and CTA ordered    Concern for possible occlusive stroke per neuro team.  They are discussing possible TNK. Patient deferred decision on TNK to her son. Neuro team did discuss this and son did give consent over the phone for the TNK. Stroke team also recommending that patient have blood pressure with systolic above 350 as patient's pressures do normally run in the lower ranges. However given that patient has significant extensive cardiac history with CHF in combination with the danger of possible intubation after TNK if patient does become fluid overloaded, decision was made to avoid fluid boluses and instead use Kamar-Synephrine. This was started after TNK had been given, therefore central line was not placed in order to reduce risk of bleeding events. And patient does have peripheral IVs.    Patient was taken to endovascular lab for thrombectomy per neuro team admit to neuro ICU    Problems Addressed:  Cerebrovascular accident (CVA) due to occlusion of cerebral artery [facility]): acute illness or injury    Amount and/or Complexity of Data Reviewed  Independent Historian: EMS     Details: Son  External Data Reviewed: labs, radiology and notes. Labs: ordered. Radiology: ordered. ECG/medicine tests: ordered and independent interpretation performed. Details: See below  Discussion of management or test interpretation with external provider(s): Stroke team     Risk  Prescription drug management. Drug therapy requiring intensive monitoring for toxicity. Decision regarding hospitalization. Critical Care  Total time providing critical care: < 30 minutes       EKG Interpretation at 00:00:53    Interpreted by emergency department physician    Clinical Impression: Biatrial enlargement with nonspecific ST and T wave findings. Mild depressions in V4 through V6.     Zainab Hernandez MD   EKG Interpretation at 00:08 POSTERIOR    Interpreted by emergency department physician    Clinical Impression:No eleations in the posterior leads. Gordo Goff MD   [facility] - Stroke    The patient arrived within 3.5 hours of their last known well time, diagnosed with subacute or acute stroke and IV-tPA therapy was initiated within 4.5 hours of their last known well time (Meets MIPS Performance if YES): Yes w/ TNK therapy which is our hospital standard            Critical Care: There was significant risk of life threatening deterioration of patient's condition requiring my direct management. Critical care time 20 minutes, excluding any documented procedures.     Gordo Goff MD  Attending Emergency Physician         Gordo Goff MD  02/17/23 1121

## 2023-02-17 NOTE — CONSULTS
Endovascular Neurosurgery Consult    Pt Name: Sina Zavala  MRN: [id number]  YOB: 1975  Date of evaluation: 2/17/2023  Primary Care Physician: No primary care provider on file. Patient evaluated at the request of  Dr. José Jackson  Reason for evaluation: R M1 occlusion    SUBJECTIVE:   History of Chief Complaint:    Sina Zavala is a 51 y.o. female with past medical history of CHF (EF 15 to 20% on echo 8/2022), severe mitral regurgitation, former smoker (quit 3 weeks ago) who presents with acute onset left facial droop, speech disturbance, and confusion. Symptom onset has been acute, starting at 10:26 PM on 2/16/2022. Last known well is 10:25 PM on 2/16/2023. Patient was watching television with her son when he noticed patient was drooling, not acting like herself, and \"talking like a baby. \"  Of note, patient recently discharged about 2 days ago from [facility] for CHF exacerbation. Denies any history of MI, past strokes, DM, seizures. Denies family history of stroke. Denies any recent TBI. Not on any APT/AC or statin at home. Patient was assessed by stroke team, initial NIH of 6, initial BP 97/73, initial . BMP and CBC mostly unremarkable. Troponins within normal limits. CT head showed hyperdense right MCA neuroendovascular is consulted for CTA head and neck finding of acute right middle cerebral artery M1 segment occlusion with stroke team deeming patient candidate for thrombectomy. Patient received TNKase at 12:42 PM on 2/17/2023. Post TNKase and thrombectomy due to patient initially hesitant to consent and wanting more time and information prior to making a decision whether she wanted treatment.   Risks and benefits discussed multiple times with patient by writer and by [facility] fellow Dr. Edwige Starr.  During encounter, patient appears to be somewhat in denial of symptoms, but symptoms are explained to patient and also discussed with both patient's son and boyfriend both of whom believe patient is not at baseline and are concerned with her current symptoms. Allergies  is allergic to penicillins. Medications  Prior to Admission medications    Medication Sig Start Date End Date Taking? Authorizing Provider   ibuprofen (ADVIL;MOTRIN) 800 MG tablet Take 1 tablet by mouth every 8 hours as needed for Pain 1/30/18   Anam Bee MD    Scheduled Meds:   [START ON 2/18/2023] enoxaparin  40 mg SubCUTAneous Daily    [START ON 2/18/2023] aspirin  81 mg Oral Daily    Or    [START ON 2/18/2023] aspirin  300 mg Rectal Daily    atorvastatin  80 mg Oral Nightly     Continuous Infusions:   phenylephrine (RAY-SYNEPHRINE) 50 mg/250 mL infusion 50 mcg/min (02/17/23 0241)     PRN Meds:.ondansetron **OR** ondansetron, polyethylene glycol  Past Medical History   has no past medical history on file. Past Surgical History   has no past surgical history on file. Social History   reports that she has quit smoking. She does not have any smokeless tobacco history on file. reports no history of alcohol use. reports no history of drug use. Family History  family history is not on file. Review of Systems: -ROS likely inaccurate due to patient's poor insight into own symptoms  Review of Systems   Eyes:  Negative for visual disturbance. Musculoskeletal:  Negative for myalgias. Neurological:  Negative for weakness, numbness and headaches.          OBJECTIVE:   Vitals: /85   Pulse 81   Temp 98.3 °F (36.8 °C) (Oral)   Resp 15   Ht 5' 9\" (1.753 m)   Wt 194 lb (88 kg)   SpO2 98%   BMI 28.65 kg/m²     General Lying in bed, no acute distress   Cardiac Regular rhythm and rate during encounter   Pulm Normal respiratory effort   Head Normocephalic, nontraumatic   Mental status   Alert but intermittently somewhat drowsy speech,  Initially confused/disoriented and not answering questions other than needing appropriately but this quickly improves after her CT scan with patient subsequently becoming more coherent, oriented x3, and with appropriate language   Speech is dysarthric  Follows commands, but sometimes requires repeated verbal stimulation to follow command  Good naming but unable to describe scene appropriately  Cannot spell world backwards  No hallucinations or delusions   Cranial nerves   II, III, IV, VI- left visual field defect which improves after TNKase, left sided visual neglect, right gaze preference without palsy, extra-ocular muscles full: no pupillary defect; no ALAN, no nystagmus, no ptosis       V - sensation symmetric         VII -  Left sided facial droop   VIII - intact hearing to conversational tone          IX, X - symmetrical palate elevation   XI - 5/5 strength  XII - tongue midline   Motor function  5/5 in b/l upper and lower extremity  Normal muscle bulk. No increased tone   Sensory function Unaware of being touched on left extremities with eyes closed,  With eyes open patient reports subjective symmetric sensation to touch and pinprick bilaterally,  Left sided neglect with double stimulation,   Cerebellar No dysmetria     Reflex function symmetric b/l symmetric in biceps, brachioradialis, patellar, calcaneal   Gait                  Not assessed     INITIAL NIH STROKE SCALE  NIH assessment slightly delayed due to patient going directly to CT Head scanner. Pt was noted to be confused while being placed into scanner which later improved. Time Performed:  11:48 PM on 2/16/2023      Administer stroke scale items in the order listed. Record performance in each category after each subscale exam. Do not go back and change scores. Follow directions provided for each exam technique. Scores should reflect what the patient does, not what the clinician thinks the patient can do. The clinician should record answers while administering the exam and work quickly. Except where indicated, the patient should not be coached (i.e., repeated requests to patient to make a special effort).       1a. Level of consciousness:  0 - alert; keenly responsive  1b. Level of consciousness questions:  0 - answers both questions correctly  1c. Level of consciousness questions:  0 - performs both tasks correctly  2. Best Gaze:  0 - normal has gaze preference   3. Visual:  1 - partial hemianopia lateral upper quadrant of left eye  4. Facial Palsy:  2 - partial paralysis (total or near total paralysis of the lower face)  5a. Motor left arm:  0 - no drift, limb holds 90 (or 45) degrees for full 10 seconds0  5b. Motor right arm:  0 - no drift, limb holds 90 (or 45) degrees for full 10 seconds0  6a. Motor left le - no drift; leg holds 30 degree position for full 5 seconds0  6b. Motor right le - no drift; leg holds 30 degree position for full 5 seconds0  7. Limb Ataxia:  0 - absent0  8. Sensory:  0 - normal; no sensory loss  9. Best Language:  1 - mild to moderate aphasia; some obvious loss of fluency or facility of comprehension without significant limitation on ideas expressed or form of expression. Reduction of speech and/or comprehension, however, makes conversation about provided materials difficult or impossible. For example, in conversation about provided materials, examiner can identify picture or naming card content from patient's response. 10.  Dysarthria:  1 - mild to moderate, patient slurs at least some words and at worst, can be understood with some difficulty  11.   Extinction and Inattention:  1 - visual, tactile, auditory, spatial or personal inattention or extinction to bilateral simultaneous stimulation in one of the sensory modalities      TOTAL:  6     Modified [place] Score Scale:      [x] Zero: No symptoms at all   [] 1: No significant disability despite symptoms; able to carry out all usual duties and activities   [] 2: Slight disability; unable to carry out all previous activities, but able to look after own affairs without assistance   [] 3:Moderate disability; requiring some help, but able to walk without assistance   [] 4: Moderately severe disability; unable to walk and attend to bodily needs without assistance   [] 5:Severe disability; bedridden, incontinent and requiring constant nursing care and attention    LABS:     Recent Labs     02/16/23  2342 02/16/23  2344   WBC  --  8.3   HGB  --  12.6   HCT  --  38.9   PLT  --  341   NA  --  135   K  --  4.5   CL  --  101   CO2  --  20   BUN  --  21*   CREATININE 0.97 0.97*   CALCIUM  --  8.8   INR  --  1.0     No results for input(s): ALKPHOS, ALT, AST, BILITOT, BILIDIR, LABALBU, AMYLASE, LIPASE in the last 72 hours. RADIOLOGY:   Images were personally reviewed including:    CT HEAD WO CONTRAST    Result Date: 2/17/2023  1. Hyperdense right middle cerebral artery consistent with thrombus. 2.  No acute intracranial hemorrhage or mass effect. Critical results were called by Dr. Cuba Weber to Dr. Zeke Joya on 2/17/2023 at 00:04. XR CHEST PORTABLE    Result Date: 2/17/2023  Lungs are clear. Cardiomediastinal silhouette is enlarged. CTA HEAD NECK W CONTRAST    Result Date: 2/17/2023  Acute right middle cerebral artery M1 segment occlusion. Critical results were called by Dr. Cuba Weber to Dr. Zeke Joya on 2/17/2023 at 00:12. IMPRESSIONS:     Acute right MCA stroke with right M1 occlusion   Low-normal BP with known CHF, EF 15-20%  PLANS:   S/p TNKase at 12:42 PM - no significant change to exam upon reassessment at 2:00 AM  Multiple discussions regarding risks/benefits discussed with patient  Emergent Thrombectomy       Discussed with [facility] Fellow Dr. Collin Garcia.    Thank you for the interesting evaluation. Further recommendations to follow.     Lyn Aviles DO  Stroke, [facility]  [facility]  Electronically signed 2/17/2023 at 3:23 AM

## 2023-02-17 NOTE — PROGRESS NOTES
Speech Language Pathology  Facility/Department: [address] NEURO ICU  Initial Speech/Language/Cognitive Assessment    NAME: cMkay De La Fuente  : 1975   MRN: [id number]  ADMISSION DATE: 2023  ADMITTING DIAGNOSIS: has Cerebrovascular accident aborted by administration of thrombolytic agent ([address] and Acute stroke due to occlusion of right middle cerebral artery ([address] on their problem list.    Date of Eval: 2023   Evaluating Therapist: Ada Knapp    Primary Complaint:     Referring Provider is requesting an evaluation for appropriate placement upon discharge from acute care. Ms. Mckay De La Fuente is a 51 y.o. female who was admitted to [facility] on 2023 with Other (dysarthria) and Drooling     69-year-old female with acute onset left facial drooping, dysarthria and confusion. Known history of cardiomyopathy ejection fraction 15 to 20% from 2022, severe mitral regurgitation, smoker recently discharged from to the hospital February 15 with CHF exacerbation. Not on anticoagulation. CT head showed hyperdense right MCA consistent with thrombus, CTA head and neck showed acute right middle cerebral artery occlusion. Patient was went for thrombectomy with endovascular surgery     Endovascular right MCA M2 anterior division occlusion treated with short sheath and stent retriever and aspiration not flowing limited focal dissection right mid cervical ICA, recommending cardioembolic work-up due to known cardiomyopathy    Pain:  Pain Assessment  Pain Assessment: Pt did not c/o pain    Vision/ Hearing  Vision  Vision: Within Functional Limits  Hearing  Hearing: Within functional limits    Assessment:      Pt presents with mild to severe cognitive deficits characterized by difficulties with sequencing, delayed recall, safety/judgement, and abstract reasoning. Pt. Presents with no dysarthria, no O/M deficits at this time. ST to follow up and provide treatment to address noted deficits. Education provided.              Recommendations:  Recommendations  Patient Education: Yes  Patient Education Response: Verbalizes understanding   Frequency: 3-5x week          Plan:   Speech Therapy Prognosis  Prognosis: Fair    Goals:  Short Term Goals  Goal 1: Pt will complete problem solving tasks with 90% accuracy  Goal 2: Pt will complete safety/judgement tasks with 90% accuracy  Goal 3: Pt will complete abstract reasoning tasks with 90% accuracy  Goal 4: Pt will recall 3-5 units with distractions with 90% accuracy  Goal 5: Pt will utilize memory compensatory strategies to aid in recall   Patient/family involved in developing goals and treatment plan: Yes    Subjective:     Social/Functional History  Lives With: Son (Lives in duplex with five sons)  Active : No  Occupation: Unemployed  Vision  Vision: Within Functional Limits  Hearing  Hearing: Within functional limits           Objective:       Oral Motor   Labial: No impairment  Lingual: No impairment    Motor Speech  Dysarthric Characteristics: None  Intelligibility: No impairment    Auditory Comprehension  Comprehension: Within Functional Limits         Expression  Primary Mode of Expression: Verbal    Verbal Expression  Verbal Expression: Within functional limits              Pragmatics/Social Functioning  Pragmatics: Within functional limits    Cognition:      Orientation  Overall Orientation Status: Within Functional Limits (Unable to state president)  Attention  Attention: Within Functional Limits  Memory  Memory: Exceptions to [facility]  Short-term Memory: Severe (0/3 increased to 3/3 with mod verbal cues, 0/3)  Immediate Memory: WFL  Problem Solving  Problem Solving: Exceptions to [facility]  Verbal Reasoning Skills: Mild (Deductive Reasoning: 3/5)  Sequencing: Mild (3/4)  Abstract Reasoning  Abstract Reasoning: Exceptions to [facility]  Convergent Thinking: Mild (2/3)  Divergent Thinking: Mild (5 in 30 sec)  Safety/Judgment  Safety/Judgment: Exceptions to WFL  Insight: Mild   (2/3)  Flexibility of Thought: Mild (2/3)    Prognosis:  Speech Therapy Prognosis  Prognosis: Fair    Education:  Patient Education: Yes  Patient Education Response: Verbalizes understanding          Therapy Time:   Individual Concurrent Group Co-treatment   Time In  10:18         Time Out  10:35         Minutes  17                 Electronically signed by Completed by:  Janneth Tristan  Clinician    Cosigned By: Julia Guaman S.CCC/SLP

## 2023-02-17 NOTE — ED NOTES
Pharmacy called regarding phenylephrine. Stated it will be verified soon and that it will be sent down as soon as possible.  MD notified      Shannen Bryan RN  02/17/23 2425

## 2023-02-17 NOTE — ED NOTES
Informed consent regarding IR procedure reviewed w pt and pts family. Pt agreed to procedure after talking to family. Pt understands risks and benefits of procedure verbalized by Endovascular surgery resident. Stroke narrator ended due to ED pt being taken to IR then to assigned bed. All stroke charting including TNK 15 min assessments, vitals, GCS, NIHSS hourly and NIH documented in Epic.       Ayan Hays RN  02/17/23 5730

## 2023-02-17 NOTE — ED PROVIDER NOTES
[facility] ED  Emergency Department Encounter  Emergency Medicine Resident     Pt Jared Garcia  MRN: [id number]  [place] 1975  Date of evaluation: 2/16/23  PCP:  No primary care provider on file. Note Started: 11:45 PM EST    CHIEF COMPLAINT       Chief Complaint   Patient presents with    Other     dysarthria    Drooling     HISTORY OF PRESENT ILLNESS  (Location/Symptom, Timing/Onset, Context/Setting, Quality, Duration, Modifying Factors, Severity.)      Ambrosio Sosa is a 51 y.o. female who presents with Left-sided facial droop as well as dysarthria with last known well at 2225 tonight per the son who called EMS. When EMS first arrived she is noted to be dysarthric with left-sided weakness. Patient is able to cooperate with examination and is oriented to person place and time. She does state that she was recently admitted for CHF exacerbation. She states that she remembers that they were waiting for food when she started to drool and then her son called EMS.     PAST MEDICAL / SURGICAL / SOCIAL / FAMILY HISTORY     PMH: Chronic systolic CHF, noischemic cardiomyopathy, MR    PSH: cardiac cath 2021    Social History     Socioeconomic History    Marital status: Single     Spouse name: Not on file    Number of children: Not on file    Years of education: Not on file    Highest education level: Not on file   Occupational History    Not on file   Tobacco Use    Smoking status: Former    Smokeless tobacco: Not on file   Substance and Sexual Activity    Alcohol use: No    Drug use: No    Sexual activity: Not on file   Other Topics Concern    Not on file   Social History Narrative    Not on file     Social Determinants of Health     Financial Resource Strain: Not on file   Food Insecurity: Not on file   Transportation Needs: Not on file   Physical Activity: Not on file   Stress: Not on file   Social Connections: Not on file   Intimate Partner Violence: Not on file   Housing Stability: Not on file     No family history on file. Allergies:  Penicillins    Home Medications:  Prior to Admission medications    Medication Sig Start Date End Date Taking? Authorizing Provider   ibuprofen (ADVIL;MOTRIN) 800 MG tablet Take 1 tablet by mouth every 8 hours as needed for Pain 1/30/18   Destiny Bernardo MD     REVIEW OF SYSTEMS       Review of Systems   Constitutional:  Negative for fatigue. HENT:  Negative for congestion and rhinorrhea. Eyes:  Negative for redness. Respiratory:  Negative for shortness of breath. Cardiovascular:  Negative for chest pain. Gastrointestinal:  Negative for abdominal pain. Genitourinary:  Negative for dysuria. Skin:  Negative for wound. Neurological:  Positive for facial asymmetry and speech difficulty. PHYSICAL EXAM      INITIAL VITALS:   /85   Pulse 81   Temp 98.3 °F (36.8 °C) (Oral)   Resp 15   Ht 5' 9\" (1.753 m)   Wt 194 lb (88 kg)   SpO2 98%   BMI 28.65 kg/m²     Physical Exam  Constitutional:       General: She is not in acute distress. Appearance: Normal appearance. HENT:      Head: Normocephalic and atraumatic. Nose: Nose normal.      Mouth/Throat:      Mouth: Mucous membranes are moist.   Eyes:      Extraocular Movements: Extraocular movements intact. Pupils: Pupils are equal, round, and reactive to light. Cardiovascular:      Rate and Rhythm: Normal rate and regular rhythm. Pulses: Normal pulses. Heart sounds: Normal heart sounds. No murmur heard. Pulmonary:      Effort: Pulmonary effort is normal. No respiratory distress. Breath sounds: Normal breath sounds. No wheezing. Abdominal:      General: There is no distension. Palpations: Abdomen is soft. Tenderness: There is no abdominal tenderness. There is no guarding or rebound. Musculoskeletal:         General: Normal range of motion. Cervical back: Normal range of motion. Right lower leg: No edema. Left lower leg: No edema. Neurological:      Mental Status: She is alert and oriented to person, place, and time. Comments: NIH 2 for left sided facial droop and dysarthria. INITIAL NIH STROKE SCALE    Time Performed:  1426 PM    1a. Level of consciousness:  0 - alert; keenly responsive  1b. Level of consciousness questions:  0 - answers both questions correctly  1c. Level of consciousness questions:  0 - performs both tasks correctly  2. Best Gaze:  0 - normal  3. Visual:  0 - no visual loss  4. Facial Palsy:  1 - minor paralysis (flattened nasolabial fold, asymmetric on smiling)  5a. Motor left arm:  0 - no drift, limb holds 90 (or 45) degrees for full 10 seconds  5b. Motor right arm:  0 - no drift, limb holds 90 (or 45) degrees for full 10 seconds  6a. Motor left le - no drift; leg holds 30 degree position for full 5 seconds  6b. Motor right le - no drift; leg holds 30 degree position for full 5 seconds  7. Limb Ataxia:  0 - absent  8. Sensory:  0 - normal; no sensory loss  9. Best Language:  0 - no aphasia, normal  10. Dysarthria:  1 - mild to moderate, patient slurs at least some words and at worst, can be understood with some difficulty  11. Extinction and Inattention:  0 - no abnormality    TOTAL:  2    DDX/DIAGNOSTIC RESULTS / EMERGENCY DEPARTMENT COURSE / MDM     Medical Decision Making  78-year-old female presenting to the emergency department seen in CT scan as RACE alert. Patient with initial NIH of 2 due to dysarthria and left-sided facial droop. Patient reported drooling at home causing her son to call EMS. Last known well approximately 10:30 PM according to EMS. Denies any other complaint at this time. Will obtain CT head to assess for stroke. Will obtain point-of-care testing to evaluate creatinine prior to CTA head and neck. Patient within window for TNK therefore will be stroke alert. Differential includes stroke, TIA, encephalopathy.   Anticipate admission for stroke work-up. Amount and/or Complexity of Data Reviewed  Independent Historian: EMS     Details: Son and boyfriend  Labs: ordered. Radiology: ordered. ECG/medicine tests: ordered and independent interpretation performed. Risk  Prescription drug management. Decision regarding hospitalization. Critical Care  Total time providing critical care: 30-74 minutes    EKG  Normal sinus rhythm at 100 bpm. Normal axis. Prolonged QTC at 497ms. T wave inversion and ST segment depression in V5 and V6. Posterior EKG without ST elevation. All EKG's are interpreted by the Emergency Department Physician who either signs or Co-signs this chart in the absence of a cardiologist.    EMERGENCY DEPARTMENT COURSE:    ED Course as of 02/17/23 0359   Thu Feb 16, 2023   2345 NIH of 2 [CP]   Fri Feb 17, 2023   0012 Spoke with boyfriend and son over the phone. [CP]   0015 EF 19% [CP]   0025 Spoke with son Vi Maldonado with Nurse Leeanne HYMAN. Son verified that he consented to TNK administration [CP]   0104 Patient speaking on phone. Sister in room [CP]   0117 IVF ordered by neurology service to maintain BP >197 systolic. Would advise against fluid bolus and aggressive hydration due EF of 19% with recent hospitalization for CHF exacerbation and risk for flash pulmonary edema which would increase risk for intubation. [CP]   3249 Patient is being admitted to the neuro ICU. Doing well after TNK. Thrombectomy planned with neuro endovascular. [CP]      ED Course User Index  [CP] Lizette Box MD     PROCEDURES:  none    CONSULTS:  IP CONSULT TO STROKE TEAM  IP CONSULT TO ENDOVASCULAR NEUROSURGERY  IP CONSULT TO NEUROCRITICAL CARE  IP CONSULT TO PHARMACY  PHARMACY TO CHANGE BASE FLUIDS    CRITICAL CARE:  There was significant risk of life threatening deterioration of patient's condition requiring my direct management. Critical care time 50 minutes, excluding any documented procedures. FINAL IMPRESSION      1.  Cerebrovascular accident (CVA) due to occlusion of cerebral artery ([facility] [address]        DISPOSITION / PLAN     DISPOSITION Admitted 02/17/2023 12:42:54 AM    PATIENT REFERRED TO:  No follow-up provider specified.     DISCHARGE MEDICATIONS:  New Prescriptions    No medications on file       Stephany Mclean MD  Emergency Medicine Resident    (Please note that portions of thisnote were completed with a voice recognition program.  Efforts were made to edit the dictations but occasionally words are mis-transcribed.)        Oscar Flyod MD  Resident  02/17/23 017 [facility] [address], MD  Resident  02/17/23 5956

## 2023-02-17 NOTE — SEDATION DOCUMENTATION
Anesthesia:  Edis Silvestre / Onel Moctezuma  / Nataliia Bailey     Attending; franco    Arrival time  2/16/ 23 @ [phone]  ED   Direct   inhouse    LKW; 2/16/23 @ 2230  TNK; 2/17/23 22mg bolus @ 0971      Wheel in lab time: 2/17  0320  Groin puncture:  8575 right groin  TICI Baseline: 0    0346 IV Cardene 10 mg added to 4000 unit heparin / 1000 ml NS procedure bag. 1st run  Deployment time: 0350 at right inferior m2 occlusion to [place] cath. Manual suction  1st run Retrieval time: 0400 no clot obtained  1st pass TICI score: 0    2nd pass deployment:  0414 trevo at right inferior m2 occlussion  2nd pass retrieval: 0418 multiple small  8 red gelatinous clots   2nd pass TICI score:  3, small non flow limiting dissection     Closure time: 0443 closure device.  Plus 15 min of manual pressure     Arrival - groin access time:  Arrival - open time:  LKW-open time:

## 2023-02-17 NOTE — CONSULTS
[facility]   Consult Note                 Date:   2023  Patient name: Malena Mcmahon  Date of admission:  2023 11:38 PM  MRN:   [id number]  YOB: 1975    Reason for Consult: Cardiac Evaluation     CHIEF COMPLAINT:  Stroke    History Obtained From:  Patient     HISTORY OF PRESENT ILLNESS:    The patient is a 51 y.o. female presenting to the Ed with stroke. Patient reportedly was sitting at home with her son watching TV when her son noted her to have slurred speech and called 911. In the ED, patient was noted to have left facial droop, dysarthria and disorientation. CTH showed thombus in the right MCAand acute right MCA M1 segment occlusion. Patient received TNKase and underwent thrombectomy. Significant medical history of nonischemic cardiomyopathy EF 15% on GDMT, diagnosed in , normal coronary angiography 2021, echocardiogram  showed EF 15% with severe MR, LVIDD 6.3 cm hypertension, COVID infection 2020, chronic tobacco use 2 packs/day reportedly quit 2023, obesity, alcoholism. Patient recently saw her cardiologist and was changed to [address] [place] [address]. Social Hx: smoking 2 ppd, alcohol 2 () since early  before her cardiomyopathy. Has 5 boys. No illicit drug abuse. Family Hx: Father  in his sleep 80. No family hx of heart failure. Past Medical History:   has no past medical history on file. Past Surgical History:   has no past surgical history on file. Home Medications:    Prior to Admission medications    Medication Sig Start Date End Date Taking? Authorizing Provider   carvedilol (COREG) 3.125 MG tablet Take 3.125 mg by mouth 2 times daily   Yes Historical Provider, MD   albuterol (PROVENTIL) (2.5 MG/3ML) 0.083% nebulizer solution Inhale 2.5 mg into the lungs in the morning and 2.5 mg at noon and 2.5 mg in the evening and 2.5 mg before bedtime.  23  Yes Historical Provider, MD   empagliflozin (JARDIANCE) 10 MG tablet Take 10 mg by mouth daily 2/16/23  Yes Historical Provider, MD   furosemide (LASIX) 40 MG tablet Take 40 mg by mouth daily 9/5/22  Yes Historical Provider, MD   magnesium oxide (MAG-OX) 400 MG tablet Take 400 mg by mouth daily 2/16/23  Yes Historical Provider, MD   spironolactone (ALDACTONE) 25 MG tablet Take 25 mg by mouth daily 2/4/22  Yes Historical Provider, MD   albuterol sulfate HFA (PROVENTIL;VENTOLIN;PROAIR) 108 (90 Base) MCG/ACT inhaler Inhale 2 puffs into the lungs every 6 hours as needed 4/22/22  Yes Historical Provider, MD   sacubitril-valsartan (ENTRESTO) 24-26 MG per tablet Take 1 tablet by mouth 2 times daily 2/16/23  Yes Historical Provider, MD   ibuprofen (ADVIL;MOTRIN) 800 MG tablet Take 1 tablet by mouth every 8 hours as needed for Pain 1/30/18   Autumn Schneider MD       Allergies:  Penicillins    Social History:   reports that she has quit smoking. She does not have any smokeless tobacco history on file. She reports that she does not drink alcohol and does not use drugs.     Family History: family history is not on file. No for premature CAD. No for h/o sudden cardiac death    REVIEW OF SYSTEMS:    Constitutional: there has been no unanticipated weight loss. There's been No change in activity level.     Eyes: No visual changes or diplopia. No scleral icterus.  ENT: No Headaches, hearing loss or vertigo.   Cardiovascular: Yes chest pain, No dyspnea on exertion, No palpitations or No loss of consciousness.   Respiratory: No cough or wheezing, no sputum production. No hematemesis. No pleuritic chest pain   Gastrointestinal: No abdominal pain, blood in stools.  Genitourinary: No dysuria, trouble voiding, or hematuria.  Musculoskeletal:  No gait disturbance, No weakness or joint complaints.  Integumentary: No rash or pruritis.  Neurological: No headache, diplopia, change in muscle strength, numbness or tingling.   Psychiatric: No anxiety, or depression.  Endocrine: No temperature intolerance.  No excessive thirst, fluid intake, or urination. Hematologic/Lymphatic: No abnormal bruising or bleeding, blood clots or swollen lymph nodes. Allergic/Immunologic: No nasal congestion or hives. PHYSICAL EXAM:    Physical Examination:    /71   Pulse 79   Temp 97.8 °F (36.6 °C) (Oral)   Resp 19   Ht 5' 9\" (1.753 m)   Wt 194 lb (88 kg)   SpO2 95%   BMI 28.65 kg/m²    Constitutional and General Appearance: alert, cooperative, in no apparent resp distress HEENT: PERRL, no cervical lymphadenopathy  Respiratory:  Good respiratory effort. No for increased work of breathing. On auscultation: clear to auscultation bilaterally, no basilar rales, no wheezing   Cardiovascular:  regular S1 and S2. No murmur audible   No Jugular venous distention, no hepatojugular reflex  Peripheral pulses are symmetrical and full   Abdomen:  No masses or tenderness  Bowel sounds present  Extremities:   No Cyanosis or Clubbing   Lower extremity edema: No   Skin: Warm and dry  Neurological:  Not done       DATA:    Diagnostics:      Cardiac testing reviewed (With Dr Naty Rivas)    EKG   02/12/2023: Sinus rhythm,  milliseconds    ECHO  TTE   08/05/2022:   LVEF 15-20%, severe mitral regurgitation, LVIDd 6.2 cm    TTE   02/22/2021:   LVEF 15-20%, LVIDd 6.5 cm, severe mitral regurgitation, moderate TR    Cardiac catheterization   03/08/2021:   Angiographically normal coronary arteries LVEDP 20-25%             Labs:     CBC:   Recent Labs     02/16/23 2344 02/17/23  0856   WBC 8.3 9.8   HGB 12.6 12.4   HCT 38.9 38.3    301     BMP:   Recent Labs     02/16/23 2342 02/16/23  2344   NA  --  135   K  --  4.5   CO2  --  20   BUN  --  21*   CREATININE 0.97 0.97*   LABGLOM  --  >60   GLUCOSE  --  119*     BNP: No results for input(s): BNP in the last 72 hours.   PT/INR:   Recent Labs     02/16/23 2344   PROTIME 10.4   INR 1.0     APTT:  Recent Labs     02/16/23 2344   APTT 22.6     CARDIAC ENZYMES:  Recent Labs 02/16/23  2344   CKTOTAL 37     FASTING LIPID PANEL:No results found for: HDL, LDLDIRECT, LDLCALC, TRIG  LIVER PROFILE:No results for input(s): AST, ALT, LABALBU in the last 72 hours. IMPRESSION:    Nonischemic cardiomyopathy EF 15% on GDMT follows with Dr. Brianne Woodall  Acute embolic stroke affecting right MCA and thrombus in right MCA status post TNKase status post thrombectomy  Hypertension  History of chronic smoking  History of alcoholism      RECOMMENDATIONS:  Continue on GDMT; Coreg 3.125 mg twice daily, Aldactone 25 mg daily. Hold fir SBP <90. Hold coreg for SBP <90, Hr <55   Hold Entresto 24-26 due to low blood pressure and will restart when bp more stable. Hold Jardiance and restart on discharge. Continue telemetry monitoring  On aspirin and Lipitor for acute embolic stroke  Daily a.m. EKG  Follow-up 2D echocardiogram to rule out LV thrombus  We will consider MORRIS on Monday based on findings on TTE  Rest of care as per primary      Discussed with patient and nursing. Zoe Villa MD MD  Fellow, [address] [facility]   Pager - [phone]      Attending Cardiologist Addendum: I have reviewed and performed the history, physical, subjective, objective, assessment, and plan with the student/resident/fellow/APN and agree with the note. I performed the history and physical personally. I have made changes to the note above as needed. S/p CVA    Check echo with definity rule out LV thrombus  Needs to follow up with Dr. Juan Batista for long term management of NICM  When able will consider more intensive GDMT    Discussed with patient in detail. All questions answered. Agrees with plan as outlined above. Thank you for allowing me to participate in the care of this patient, please do not hesitate to call if you have any questions. Vinicius Roberts DO, [facility], [address], [address] [facility]  [facility]. [facility]  [phone]

## 2023-02-17 NOTE — CONSULTS
Department of Endovascular Neurosurgery                                                                                                                      Resident Consult Note  Stroke Alert paged @ 11:35 PM  ER Room # 14 (pt initially taken directly to CT scanner)  Arrival to patient bedside/CT scanner @ 11:40 PM        Reason for Consult:  Stroke Alert  Requesting Physician:  Dr. Magan Cummings  Stroke Attending:  [x]Dr. Vesta Hartmann  Endovascular Neurosurgeon:   [x]Dr. Dandre Melissa  []Dr. Angy Manning  []Dr. Whitney Souza       History Obtained From:  patient, family member - son, electronic medical record    CHIEF COMPLAINT:       Facial droop and speech disturbance    HISTORY OF PRESENT ILLNESS:       The patient is a 51 y.o. female with HFrEF due to non-ischemic cardiomyopathy, severe mitral regurgitation per last echo, former smoker (quit 3 weeks ago), who presents as a STROKE Alert after son called EMS when he noted patient having facial droop with drooling from left side, becoming confused, with speech disturbance including slight slurring and slowed speech described as pt \"talking like a baby,\" and not acting like herself. Symptom onset has been acute, starting at 10:26 PM on 2/16/2022. Last known well is 10:25 PM on 2/16/2023. Of note, patient had recent hospitalization at [facility] for CHF exacerbation; discharged 2 days ago and patient seen by OP Cardiologist yesterday with some medication changes done. She denies any history of MI, past strokes, DM, seizures. Denies family history of stroke. Denies any recent TBI. Last known well: 10:25 PM     On presentation:  BP: 97/73  BSL: 119    Prior to arrival patient was on  Antiplatelets/anticoagulants: none  Statins: none    Home medications: spironolactone 25 mg po qd, carvdeilol 3.125 mg qd and hs, lasix 40 mg po qd, Entresto 24-26 mg hs.      Smoking history: former smoker, quit 3 weeks ago (on 1/24/2023); 1 ppd x 20 years    Patient briefly seen while she was getting placed on CT scanner, at which point she was oriented self but not answering questions appropriately but able to follow commands. After scan, patient did become more coherent and was oriented x3, however she had poor insight into condition. Patient deemed candidate for TNKase but administration of tenecteplase was delayed due to patient and son initially hesitant to consent and wanting further discussion/information and time to think about decision prior to consenting. Patient wanted her son involved in decision making and allowed for him to give consent on her behalf once we had explained everything to him. Son spoken to via telephone call (was unable to come to hospital due to house arrest) by Wiley Sanz and given further explanation of risks and benefits of TNKase by attending Dr. Myrtle Valera over the phone. Consent was given by son which was confirmed by ED RN and ED physician Dr. Trixie Jason over telephone. Patient agreed with her son's decision to consent to [facility]. TNKase given at 12:42 PM on 2/16/2023. Patient also deemed candidate for thrombectomy and NEV was consulted but patient was initially refusing due to being nervous regarding surgery and patient wanting to wait and see if symptoms would improve with TNKase. Patient reassessed on 2 occasions, latest being  by stroke team after [facility] administration with no significant changes to overall exam noted even on latest reassessment at about 2:00 a.m.. Santos Castro subsequently held discussion regarding thrombectomy with patient again with patient making final decision to consent to thrombectomy. PAST MEDICAL HISTORY :       Past Medical History:    No past medical history on file. Past Surgical History:    No past surgical history on file.     Social History:   Social History     Socioeconomic History    Marital status: Single     Spouse name: Not on file    Number of children: Not on file    Years of education: Not on file    Highest education level: Not on file   Occupational History    Not on file   Tobacco Use    Smoking status: Former    Smokeless tobacco: Not on file   Substance and Sexual Activity    Alcohol use: No    Drug use: No    Sexual activity: Not on file   Other Topics Concern    Not on file   Social History Narrative    Not on file     Social Determinants of Health     Financial Resource Strain: Not on file   Food Insecurity: Not on file   Transportation Needs: Not on file   Physical Activity: Not on file   Stress: Not on file   Social Connections: Not on file   Intimate Partner Violence: Not on file   Housing Stability: Not on file       Family History:   No family history on file. Allergies:  Penicillins    Home Medications:  Prior to Admission medications    Medication Sig Start Date End Date Taking?  Authorizing Provider   ibuprofen (ADVIL;MOTRIN) 800 MG tablet Take 1 tablet by mouth every 8 hours as needed for Pain 1/30/18   Kendell Ruiz MD       Current Medications:   Current Facility-Administered Medications: ondansetron (ZOFRAN-ODT) disintegrating tablet 4 mg, 4 mg, Oral, Q8H PRN **OR** ondansetron (ZOFRAN) injection 4 mg, 4 mg, IntraVENous, Q6H PRN  polyethylene glycol (GLYCOLAX) packet 17 g, 17 g, Oral, Daily PRN  [START ON 2/18/2023] enoxaparin (LOVENOX) injection 40 mg, 40 mg, SubCUTAneous, Daily  [START ON 2/18/2023] aspirin EC tablet 81 mg, 81 mg, Oral, Daily **OR** [START ON 2/18/2023] aspirin suppository 300 mg, 300 mg, Rectal, Daily  atorvastatin (LIPITOR) tablet 80 mg, 80 mg, Oral, Nightly  phenylephrine (RAY-SYNEPHRINE) 50 mg in sodium chloride 0.9 % 250 mL infusion,  mcg/min, IntraVENous, Continuous  Facility-Administered Medications Ordered in Other Encounters: midazolam (VERSED) injection 2 mg/2mL, , IntraVENous, PRN  fentaNYL (SUBLIMAZE) injection, , IntraVENous, PRN  clindamycin (CLEOCIN) 600 mg in dextrose 5 % 50 mL IVPB, , IntraVENous, PRN  0.9 % sodium chloride infusion, , IntraVENous, Continuous PRN    REVIEW OF SYSTEMS:       ROS may be inaccurate due to patient's poor insight into own symptoms    Review of Systems   Eyes:  Negative for visual disturbance. Neurological:  Negative for weakness, numbness and headaches. Psychiatric/Behavioral:  Negative for confusion. PHYSICAL EXAM:     Vitals: /85 during encounter   Pulse 81   Temp 98.3 °F (36.8 °C) (Oral)   Resp 15   Ht 5' 9\" (1.753 m)   Wt 194 lb (88 kg)   SpO2 98%   BMI 28.65 kg/m²      General Lying in bed, no acute distress   Cardiac Regular rhythm and rate during encounter   Pulm Normal respiratory effort   Head Normocephalic, nontraumatic   Mental status    Alert but intermittently somewhat drowsy speech,  Initially confused/disoriented and not answering questions other than needing appropriately but this quickly improves after her CT scan with patient subsequently becoming more coherent, oriented x3, and with appropriate language   Speech is dysarthric  Follows commands, but sometimes requires repeated verbal stimulation to follow command  Good naming but unable to describe scene appropriately  Cannot spell world backwards  No hallucinations or delusions   Cranial nerves    II, III, IV, VI- left visual field defect which improves after TNKase, left sided visual neglect, right gaze preference without palsy, extra-ocular muscles full: no pupillary defect; no ALAN, no nystagmus, no ptosis       V - sensation symmetric         VII -  Left sided facial droop   VIII - intact hearing to conversational tone          IX, X - symmetrical palate elevation   XI - 5/5 strength  XII - tongue midline   Motor function  5/5 in b/l upper and lower extremity  Normal muscle bulk.   No increased tone   Sensory function Unaware of being touched on left extremities with eyes closed,  With eyes open patient reports subjective symmetric sensation to touch and pinprick bilaterally,  Left sided neglect with double stimulation,   Cerebellar No dysmetria     Reflex function symmetric b/l symmetric in biceps, brachioradialis, patellar, calcaneal   Gait                  Not assessed        INITIAL NIH STROKE SCALE  NIH assessment slightly delayed due to patient going directly to CT Head scanner. Pt was noted to be confused while being placed into scanner which later improved. Time Performed:  11:48 PM on 2023    Administer stroke scale items in the order listed. Record performance in each category after each subscale exam. Do not go back and change scores. Follow directions provided for each exam technique. Scores should reflect what the patient does, not what the clinician thinks the patient can do. The clinician should record answers while administering the exam and work quickly. Except where indicated, the patient should not be coached (i.e., repeated requests to patient to make a special effort). 1a.  Level of consciousness:  0 - alert; keenly responsive  1b. Level of consciousness questions:  0 - answers both questions correctly  1c. Level of consciousness questions:  0 - performs both tasks correctly  2. Best Gaze:  0 - normal has right sided gaze preference   3. Visual:  1 - partial hemianopia lateral upper quadrant of left eye  4. Facial Palsy:  2 - partial paralysis (total or near total paralysis of the lower face)  5a. Motor left arm:  0 - no drift, limb holds 90 (or 45) degrees for full 10 seconds  5b. Motor right arm:  0 - no drift, limb holds 90 (or 45) degrees for full 10 seconds  6a. Motor left le - no drift; leg holds 30 degree position for full 5 seconds  6b. Motor right le - no drift; leg holds 30 degree position for full 5 seconds  7. Limb Ataxia:  0 - absent  8.     Sensory:  0 - normal; no sensory loss => initial assessment scored as 0 for sensory loss but on later reassessment due to noted poor insight/denial of patient into own symptoms and noting patient looking at where I was assessing sensation prior to answering, writer reassessed sensation while keeping patient's eyes closed and patient NOT aware of being touched on LUE even with single stimulation so in hindsight would score 2 for sensory  9. Best Language:  1 - mild to moderate aphasia; some obvious loss of fluency or facility of comprehension without significant limitation on ideas expressed or form of expression. Reduction of speech and/or comprehension, however, makes conversation about provided materials difficult or impossible. For example, in conversation about provided materials, examiner can identify picture or naming card content from patient's response. 10.  Dysarthria:  1 - mild to moderate, patient slurs at least some words and at worst, can be understood with some difficulty  11.   Extinction and Inattention:  1 - visual, tactile, auditory, spatial or personal inattention or extinction to bilateral simultaneous stimulation in one of the sensory modalities     T[facility]:  [facility] sensation, believe initial NIH possibly higher at total score of 8    Modified Artur Score Scale:     [x] Zero: No symptoms at all   [] 1: No significant disability despite symptoms; able to carry out all usual duties and activities   [] 2: Slight disability; unable to carry out all previous activities, but able to look after own affairs without assistance   [] 3:Moderate disability; requiring some help, but able to walk without assistance   [] 4: Moderately severe disability; unable to walk and attend to bodily needs without assistance   [] 5:Severe disability; bedridden, incontinent and requiring constant nursing care and attention      LABS AND IMAGING:     CBC with Differential:    Lab Results   Component Value Date/Time    WBC 8.3 02/16/2023 11:44 PM    RBC 4.49 02/16/2023 11:44 PM    HGB 12.6 02/16/2023 11:44 PM    HCT 38.9 02/16/2023 11:44 PM     02/16/2023 11:44 PM    MCV 86.6 02/16/2023 11:44 PM    MCH 28.1 02/16/2023 11:44 PM    MCHC 32.4 02/16/2023 11:44 PM    RDW 15.5 02/16/2023 11:44 PM    LYMPHOPCT 28 02/16/2023 11:44 PM    MONOPCT 9 02/16/2023 11:44 PM    BASOPCT 1 02/16/2023 11:44 PM    MONOSABS 0.76 02/16/2023 11:44 PM    LYMPHSABS 2.30 02/16/2023 11:44 PM    EOSABS 0.49 02/16/2023 11:44 PM    BASOSABS 0.04 02/16/2023 11:44 PM         BMP:    Lab Results   Component Value Date/Time     02/16/2023 11:44 PM    K 4.5 02/16/2023 11:44 PM     02/16/2023 11:44 PM    CO2 20 02/16/2023 11:44 PM    BUN 21 02/16/2023 11:44 PM    CREATININE 0.97 02/16/2023 11:44 PM    CREATININE 0.97 02/16/2023 11:42 PM    CALCIUM 8.8 02/16/2023 11:44 PM    LABGLOM >60 02/16/2023 11:44 PM    GLUCOSE 119 02/16/2023 11:44 PM       Radiology Review:    CT HEAD WO CONTRAST    Result Date: 2/17/2023  1. Hyperdense right middle cerebral artery consistent with thrombus. 2.  No acute intracranial hemorrhage or mass effect. Critical results were called by Dr. Davin Lino Sessions to Dr. Riccardo Martinez on 2/17/2023 at 00:04. XR CHEST PORTABLE    Result Date: 2/17/2023  Lungs are clear. Cardiomediastinal silhouette is enlarged. CTA HEAD NECK W CONTRAST    Result Date: 2/17/2023  Acute right middle cerebral artery M1 segment occlusion. Critical results were called by Dr. Davin Lino Sessions to Dr. Riccardo Martinez on 2/17/2023 at 00:12. ASSESSMENT AND PLAN:         Assessment                 51 y.o. female with PMHx of former smoker (quit 3 weeks ago) and CHF (EF 15-20% on 8/22 echo) who presents with acute onset left sided facial droop, left sided neglect, and dysarthria in setting of low normal blood pressures. Initial BP 97/73, , NIH 6 - 8. CT Head negative for any acute ICH but showing right MCA hyperdensity consistent with thrombus. CTA H/N showing acute right MCA M1 segment occlusion.      Impression is acute stroke due to occlusion of right middle cerebral artery    Last Known Well (date and time): LKW 10:15 PM on 2/16/2023.     2. Candidate for IV tPA therapy     Yes [x]    No  [] due to the following exclusion criteria:     3. Candidate for Thrombectomy    Yes [x]      No [] due to the following exclusion criteria:     - Discussed with Dr. Joanne Martinez     Recommendations:       Imaging   - CT Head  WO : done   - CTA Head and Neck : done   - MRI Brain WO   - ECHO with bubble study  - Repeat CT Head 24 hours after TNKase    Medications   - TNKase  - administered at 12:42 PM on 2/17/2023  - Aspirin 81  mg daily to be started 24 hours after TNKase administration  - Atorvastatin 80 mg nightly     Labs  - Fasting Lipid panel  - HgbA1c lab    Procedures  - Endovascular Neurosurgery consulted for emergent thrombectomy      - PT, OT, Speech eval   - We recommend SBP > 120  - IVF not given due to HF with very low EF, mayo-synephrine prn for BP support   - Telemetry   - Neuro checks per protocol  - Blood glucose goal less than 180  - Please avoid dextrose containing solutions  - TNKase protocol  - Patient will need to be admitted to [facility] discussed with patient, ED staff, Pari Sanchez  team, and NICU staff      Additional recommendations may follow    Please contact [facility] with any changes in patients neurologic status. Thank you for your consult.      Electronically signed by Wesley Alexis DO on 2/17/2023 at 4:29 AM

## 2023-02-17 NOTE — H&P
Neuro ICU History & Physical    Patient Name: Cele Cornejo  Patient : 1975  Room/Bed: CHARITY/CHARITY  Code Status: Full  Allergies: Allergies   Allergen Reactions    Penicillins        CHIEF COMPLAINT     Facial droop, dysarthria    HPI    History Obtained From: Patient, EMR    The patient is a 51 y.o. female presented with acute onset of left facial drooping, dysarthria and confusion. Patient's son was present with her when symptoms began at 10:26 PM, he states he noted the left side of her face drooping and patient began drooling and \"talking like a baby\". LKW 10:25 PM. Patient with PMHx CHF (EF 15-20% 2022), severe mitral regurgitation, smoker. Recent discharge 2/15/2023 from [facility] for CHF exacerbation. Not on anticoagulation. No prior history of stroke like symptoms. Stroke alert was called on patient arrival, stroke team met patient at [place] [id number] directly from the ambulance bay. Initial NIH 6. CT head showing hyperdense right MCA consistent with thrombus. CTA head neck showing acute right middle cerebral artery M1 segment occlusion. Patient received TNKase at 12:42 AM. Patient was determined to be a candidate for thrombectomy and subsequently went for thrombectomy with endovascular neurosurgery. Admitted to ICU From: Emergency Department  Reason for ICU Admission: Post-TNKase and thrombectomy       PATIENT HISTORY   Past Medical History:    No past medical history on file. CHF (EF 15-20%), severe mitral regurgitation, smoker    Past Surgical History:    No past surgical history on file. Social History:   Social History     Socioeconomic History    Marital status: Single     Spouse name: Not on file    Number of children: Not on file    Years of education: Not on file    Highest education level: Not on file   Occupational History    Not on file   Tobacco Use    Smoking status: Former    Smokeless tobacco: Not on file   Substance and Sexual Activity    Alcohol use: No    Drug use:  No Sexual activity: Not on file   Other Topics Concern    Not on file   Social History Narrative    Not on file     Social Determinants of Health     Financial Resource Strain: Not on file   Food Insecurity: Not on file   Transportation Needs: Not on file   Physical Activity: Not on file   Stress: Not on file   Social Connections: Not on file   Intimate Partner Violence: Not on file   Housing Stability: Not on file       Family History:   No family history on file. Allergies:    Penicillins    Medications Prior to Admission:    Not in a hospital admission.     Current Medications:  Current Facility-Administered Medications: ondansetron (ZOFRAN-ODT) disintegrating tablet 4 mg, 4 mg, Oral, Q8H PRN **OR** ondansetron (ZOFRAN) injection 4 mg, 4 mg, IntraVENous, Q6H PRN  polyethylene glycol (GLYCOLAX) packet 17 g, 17 g, Oral, Daily PRN  [START ON 2/18/2023] enoxaparin (LOVENOX) injection 40 mg, 40 mg, SubCUTAneous, Daily  [START ON 2/18/2023] aspirin EC tablet 81 mg, 81 mg, Oral, Daily **OR** [START ON 2/18/2023] aspirin suppository 300 mg, 300 mg, Rectal, Daily  atorvastatin (LIPITOR) tablet 80 mg, 80 mg, Oral, Nightly  phenylephrine (RAY-SYNEPHRINE) 50 mg in sodium chloride 0.9 % 250 mL infusion,  mcg/min, IntraVENous, Continuous  iodixanol (VISIPAQUE) 320 MG/ML injection 100 mL, 100 mL, IntraVENous, ONCE PRN  Facility-Administered Medications Ordered in Other Encounters: midazolam (VERSED) injection 2 mg/2mL, , IntraVENous, PRN  fentaNYL (SUBLIMAZE) injection, , IntraVENous, PRN  clindamycin (CLEOCIN) 600 mg in dextrose 5 % 50 mL IVPB, , IntraVENous, PRN  0.9 % sodium chloride infusion, , IntraVENous, Continuous PRN    REVIEW OF SYSTEMS     CONSTITUTIONAL: negative for fatigue and malaise   EYES: negative for double vision and photophobia    HEENT: negative for tinnitus and sore throat   RESPIRATORY: negative for cough, shortness of breath   CARDIOVASCULAR: negative for chest pain, palpitations, or syncope GASTROINTESTINAL: negative for abdominal pain, nausea, vomiting, diarrhea, or constipation    GENITOURINARY: negative for incontinence or retention    MUSCULOSKELETAL: negative for neck or back pain, negative for extremity pain   NEUROLOGICAL: Negative for seizures, headaches, weakness, numbness, confusion   PSYCHIATRIC: negative for agitation, hallucination, SI/HI   SKIN Negative for spontaneous contusions, rashes, or lesions      PHYSICAL EXAM:     /85   Pulse 81   Temp 98.3 °F (36.8 °C) (Oral)   Resp 15   Ht 5' 9\" (1.753 m)   Wt 194 lb (88 kg)   SpO2 98%   BMI 28.65 kg/m²     PHYSICAL EXAM:  CONSTITUTIONAL:  Awake, alert. Slow deliberate speech with dysarthria. Confused.     HEAD:  normocephalic, atraumatic    EYES:  PERRLA, EOMI.   ENT:  moist mucous membranes   LUNGS:  Equal air entry bilaterally   CARDIOVASCULAR:  normal s1 / s2   ABDOMEN:  Soft, no rigidity   NECK supple, symmetric, no midline tenderness to palpation    BACK without midline tenderness, step-offs or deformities    EXTREMITIES Normal ROM with no deformities   NEUROLOGIC:  Mental Status:  Alert, slow deliberate speech, confused             Cranial Nerves:    II, III:  abnormal - left sided visual field defect, right gaze preference  III: Pupils:  equal, round, reactive to light  III,IV,VI: Extra Ocular Movements: intact, right gaze preference  VII: Facial strength: abnormal left facial droop  IX: Palate:  intact  XI: Shoulder shrug:  intact    Motor Exam:    Drift:  absent  Tone:  normal    Motor exam is symmetrical 5 out of 5 all extremities bilaterally    Sensory:    Touch:    Right Upper Extremity:  normal  Left Upper Extremity:  abnormal - decreased sensation to pinprick, light touch with eyes closed, with eyes open normal sensation  Right Lower Extremity:  normal  Left Lower Extremity:  abnormal - decreased sensation to pinprick, light touch, with eyes open normal sensation     SKIN No obvious ecchymosis, rashes, or lesions NIH Stroke Scale Total (if not done complete detailed one below):    1a.  Level of consciousness:  0 - alert; keenly responsive  1b. Level of consciousness questions:  0 - answers both questions correctly  1c. Level of consciousness questions:  0 - performs both tasks correctly  2. Best Gaze:  0 - normal  3. Visual:  1 - partial hemianopia  4. Facial Palsy:  2 - partial paralysis (total or near total paralysis of the lower face)  5a. Motor left arm:  0 - no drift, limb holds 90 (or 45) degrees for full 10 seconds  5b. Motor right arm:  0 - no drift, limb holds 90 (or 45) degrees for full 10 seconds  6a. Motor left le - no drift; leg holds 30 degree position for full 5 seconds  6b. Motor right le - no drift; leg holds 30 degree position for full 5 seconds  7. Limb Ataxia:  0 - absent  8. Sensory:  1 - mild to moderate sensory loss; patient feels pinprick is less sharp or is dull on the affected side; there is a loss of superficial pain with pinprick but patient is aware of being touched   9. Best Language:  1 - mild to moderate aphasia; some obvious loss of fluency or facility of comprehension without significant limitation on ideas expressed or form of expression. Reduction of speech and/or comprehension, however, makes conversation about provided materials difficult or impossible. For example, in conversation about provided materials, examiner can identify picture or naming card content from patient's response. 10.  Dysarthria:  1 - mild to moderate, patient slurs at least some words and at worst, can be understood with some difficulty  11.   Extinction and Inattention:  1 - visual, tactile, auditory, spatial or personal inattention or extinction to bilateral simultaneous stimulation in one of the sensory modalities     LABS AND IMAGING:     RECENT LABS:  CBC with Differential:    Lab Results   Component Value Date/Time    WBC 8.3 2023 11:44 PM    RBC 4.49 2023 11:44 PM    HGB 12.6 02/16/2023 11:44 PM    HCT 38.9 02/16/2023 11:44 PM     02/16/2023 11:44 PM    MCV 86.6 02/16/2023 11:44 PM    MCH 28.1 02/16/2023 11:44 PM    MCHC 32.4 02/16/2023 11:44 PM    RDW 15.5 02/16/2023 11:44 PM    LYMPHOPCT 28 02/16/2023 11:44 PM    MONOPCT 9 02/16/2023 11:44 PM    BASOPCT 1 02/16/2023 11:44 PM    MONOSABS 0.76 02/16/2023 11:44 PM    LYMPHSABS 2.30 02/16/2023 11:44 PM    EOSABS 0.49 02/16/2023 11:44 PM    BASOSABS 0.04 02/16/2023 11:44 PM     BMP:    Lab Results   Component Value Date/Time     02/16/2023 11:44 PM    K 4.5 02/16/2023 11:44 PM     02/16/2023 11:44 PM    CO2 20 02/16/2023 11:44 PM    BUN 21 02/16/2023 11:44 PM    CREATININE 0.97 02/16/2023 11:44 PM    CREATININE 0.97 02/16/2023 11:42 PM    CALCIUM 8.8 02/16/2023 11:44 PM    LABGLOM >60 02/16/2023 11:44 PM    GLUCOSE 119 02/16/2023 11:44 PM       RADIOLOGY:     CT HEAD WO CONTRAST  Result Date: 2/17/2023  1. Hyperdense right middle cerebral artery consistent with thrombus. 2.  No acute intracranial hemorrhage or mass effect. CTA HEAD NECK W CONTRAST  Result Date: 2/17/2023  Acute right middle cerebral artery M1 segment occlusion. Labs and Images reviewed with:    [] Keara Csatellanos MD    [] Maria A Fuller MD  [x] Nik Baez MD  --[] there are no new interval images to review. ASSESSMENT AND PLAN:         ASSESSMENT:     This is a 51 y.o. female with LKW 10:25 PM with acute onset left facial droop, dysarthria, and confusion. CT showing hyperdense right middle cerebral artery consistent with thrombus and acute right middle cerebral artery M1 segment occlusion. Patient received TNK at 12:42 AM and patient subsequently had thrombectomy, then was transferred to neuro ICU for post-thrombolytic monitoring. Patient care will be discussed with attending, will reevaluate patient along with attending.      PLAN/MEDICAL DECISION MAKING:    NEUROLOGIC:  - Repeat CT head today post thrombectomy   - Repeat CT head 24 hours after TNK  - S/p TNK 12:42 AM  - S/p thrombectomy  - ASA 81 daily  - Goal SBP  per NEV  - Focal ICA dissection noted after thrombectomy per NEV  - Give ASA STAT after CT head this morning if unchanged or improved per NEV  - Neuro checks per protocol    CARDIOVASCULAR:  - Hx CHF, EF 15-20% per ECHO 8/22, recent admission on 2/15 for CHF exacerbation  - Home meds: Entresto  - Phenylephrine gtt  - Avoid IVF given severe CHF  - ASA 81 mg daily  - Lipitor 80  - Goal SBP  per NEV  - Continue telemetry    PULMONARY:  - Maintaining oxygen saturation on RA  - Continuous pulse oximetry  - IS    RENAL/FLUID/ELECTROLYTE:  - BUN 21/ Creatinine 0.97  - Monitor I&O's  - Replace electrolytes PRN  - Daily BMP    GI/NUTRITION:  NUTRITION:  Diet NPO  - Bowel regimen: Glycolax prn  - GI prophylaxis: None - start lovenox > 24 hours after TNK    ID:  - WBC 8.3  - Continue to monitor for fevers  - Daily CBC    HEME:   - H&H 12.6 / 38.9  - Platelets 003  - Daily CBC    ENDOCRINE:  - Continue to monitor blood glucose, goal <180    OTHER:  - PT/OT/ST    PROPHYLAXIS:  Stress ulcer: None    DVT PROPHYLAXIS:  - SCD sleeves - Thigh High   -Aspirin    DISPOSITION: To remain ICU      Jyoti Rios MD  Neuro Critical Care Service   Pager [phone]  2/17/2023     4:41 AM

## 2023-02-17 NOTE — SEDATION DOCUMENTATION
Post Procedure Transfer from IR Table  [x] Tubes and Lines intact          Patient came to IR with figueroa placed in ER. Post Procedure Neuro-Checks/NIHSS/Vitals  [x] Completed post procedure  [x] Completed bedside handoff  [x] Frequency ordered  [x] Verbal communication of frequency      Post Procedure Puncture Site Checks  [x] Completed post procedure  [x] Completed bedside handoff  [x] Frequency ordered  [x] Verbal communication of frequency    Post Procedure Pulse  Checks  [x] Completed post procedure  [x] Completed bedside handoff  [x] Frequency ordered  [x] Verbal communication of frequency    Order Set  [x] Post Neuro-Endo Procedure  [] Stroke  [] t-PA     B/P control  [x] Verbal Communication   [x] Order in Care Path    Medication Review  [x] Given during procedure; IV clindamycin 600 mg, IV fentanyl 100 mg, IV versed 1 mg.    [] Current drips/meds/fluids    [x] Physician Notified of All changes in Assessment  0320 patient enters biplane room 1, awake, good eye contact,  Left facial droop, dysarthria, left vision cut,  Patient following commands, patients confusion and visual deficits improved since TNK. Patient  Is anxious and refused TNK and thrombectomy at first.  Needed explanation and family involvement before patient agreeable to treatments. Pedal pulses palpable bilat, patient has figueroa intact draining clear yellow urine. 0443 closure device deployed plus manual pressure  0445 patient wakes easily and staying awake, direct eye contact, speech much improved, slight slur noted, no vision discrepancy, moving all extremities strong power, following commands. Denies any chest pain, numbness or tingling. Patient left facial droop only minimally seen. Pedal pulses palpable bilat. Figueroa remains, draining clear yellow urine. Right groin being held by Dr. Stacey Arellano.    0455 manual pressure to right groin d/c'd.    0500 patient transported to 119 in bed, monitored bynan and CRNA.   Report given at bedside. 5121 bedside RN to take over post angio checks 0525. Family  [x] Location Post Procedure  0510  notified family by phone post op.

## 2023-02-17 NOTE — CONSULTS
Physical Medicine & Rehabilitation  Consult Note      Admitting Physician: Kyrie Merino MD    Primary Care Provider: No primary care provider on file. Reason for Consult:  Acute Inpatient Rehabilitation    Chief Complaint: Facial droop, dysarthria    History of Present Illness:  Referring Provider is requesting an evaluation for appropriate placement upon discharge from acute care. Ms. Marifer Juares is a 51 y.o. female who was admitted to [facility] on 2/16/2023 with Other (dysarthria) and Drooling    25-year-old female with acute onset left facial drooping, dysarthria and confusion. Known history of cardiomyopathy ejection fraction 15 to 20% from August 2022, severe mitral regurgitation, smoker recently discharged from to the hospital February 15 with CHF exacerbation. Not on anticoagulation. CT head showed hyperdense right MCA consistent with thrombus, CTA head and neck showed acute right middle cerebral artery occlusion. Patient was went for thrombectomy with endovascular surgery    Endovascular right MCA M2 anterior division occlusion treated with short sheath and stent retriever and aspiration not flowing limited focal dissection right mid cervical ICA, recommending cardioembolic work-up due to known cardiomyopathy    Radiology:  CT HEAD WO CONTRAST    Result Date: 2/17/2023  1. Hyperdense right middle cerebral artery consistent with thrombus. 2.  No acute intracranial hemorrhage or mass effect. Critical results were called by Dr. Saeed Cover Sessions to Dr. Pimentel Living on 2/17/2023 at 00:04. XR CHEST PORTABLE    Result Date: 2/17/2023  Lungs are clear. Cardiomediastinal silhouette is enlarged. CTA HEAD NECK W CONTRAST    Result Date: 2/17/2023  Acute right middle cerebral artery M1 segment occlusion. Critical results were called by Dr. Saeed Cover Sessions to Dr. Pimentel Living on 2/17/2023 at 00:12.        Review of Systems:  Constitutional: negative for anorexia, chills, fatigue, fevers, sweats and weight loss  Eyes: negative for redness and visual disturbance  Ears, nose, mouth, throat, and face: negative for earaches, sore throat and tinnitus  Respiratory: negative for cough and shortness of breath  Cardiovascular: negative for chest pain, dyspnea and palpitations  Gastrointestinal: negative for abdominal pain, change in bowel habits, constipation, nausea and vomiting  Genitourinary:negative for dysuria, frequency, hesitancy and urinary incontinence  Integument/breast: negative for pruritus and rash  Musculoskeletal:negative for muscle weakness and stiff joints  Neurological: negative for dizziness, headaches and weakness  Behavioral/Psych: negative for decreased appetite, depression and fatigue    Functional History:  PTA: Independent with all activities. Current:  PT:                                 OT:             ST:      Past Medical History:    No past medical history on file. Past Surgical History:    No past surgical history on file. Allergies:     Allergies   Allergen Reactions    Penicillins         Current Medications:   Current Facility-Administered Medications: polyethylene glycol (GLYCOLAX) packet 17 g, 17 g, Oral, Daily PRN  atorvastatin (LIPITOR) tablet 80 mg, 80 mg, Oral, Nightly  sodium chloride flush 0.9 % injection 5-40 mL, 5-40 mL, IntraVENous, 2 times per day  sodium chloride flush 0.9 % injection 5-40 mL, 5-40 mL, IntraVENous, PRN  0.9 % sodium chloride infusion, , IntraVENous, PRN  acetaminophen (TYLENOL) tablet 650 mg, 650 mg, Oral, Q4H PRN  ondansetron (ZOFRAN-ODT) disintegrating tablet 4 mg, 4 mg, Oral, Q8H PRN **OR** ondansetron (ZOFRAN) injection 4 mg, 4 mg, IntraVENous, Q6H PRN  furosemide (LASIX) tablet 20 mg, 20 mg, Oral, Daily    Social History:  Social History     Socioeconomic History    Marital status: Single     Spouse name: Not on file    Number of children: Not on file    Years of education: Not on file    Highest education level: Not on file   Occupational History    Not on file   Tobacco Use    Smoking status: Former    Smokeless tobacco: Not on file   Substance and Sexual Activity    Alcohol use: No    Drug use: No    Sexual activity: Not on file   Other Topics Concern    Not on file   Social History Narrative    Not on file     Social Determinants of Health     Financial Resource Strain: Not on file   Food Insecurity: Not on file   Transportation Needs: Not on file   Physical Activity: Not on file   Stress: Not on file   Social Connections: Not on file   Intimate Partner Violence: Not on file   Housing Stability: Not on file         Family History:   No family history on file.        Physical Exam:    /71   Pulse 79   Temp 97.8 °F (36.6 °C) (Oral)   Resp 19   Ht 5' 9\" (1.753 m)   Wt 194 lb (88 kg)   SpO2 95%   BMI 28.65 kg/m²     General appearance: alert, appears stated age, cooperative, and no distress  HEENT: Normocephalic, without obvious abnormality, atraumatic               Eyes: conjunctivae clear.                Throat: tongue normal.               Neck:  symmetrical, trachea midline.  Pulm: clear to auscultation bilaterally.  Cardiac: regular rate and rhythm, no murmur.  Abdomen: soft, non-tender; bowel sounds normal.  MSK: extremities normal, atraumatic, no edema, normal tone.   ROM: Functional range of motion upper and lower extremities  Mental status/Psych: Alert, orientedX3, thought content appropriate.  Knew year, president and location, follows commands  Skin:     Neuro:    Sensory: Intact in BUE and BLE to soft and pin sensation.  Motor: Muscle tone and bulk are normal bilaterally. No pronator drift.  4+/5 upper and lower extremities  Coordination: finger to nose normal bilaterally.      Diagnostics:  CBC   Lab Results   Component Value Date/Time    WBC 9.8 02/17/2023 08:56 AM    RBC 4.43 02/17/2023 08:56 AM    HGB 12.4 02/17/2023 08:56 AM    HCT 38.3 02/17/2023 08:56 AM    MCV 86.5 02/17/2023 08:56 AM    RDW 15.6 02/17/2023 08:56 AM      02/17/2023 08:56 AM     BMP    Lab Results   Component Value Date/Time     02/16/2023 11:44 PM    K 4.5 02/16/2023 11:44 PM     02/16/2023 11:44 PM    CO2 20 02/16/2023 11:44 PM    BUN 21 02/16/2023 11:44 PM     Uric Acid  No components found for: URIC  VITAMIN B12 No components found for: B12  PT/INR  No results found for: PTINR      Impression: Ms. Tarun Anne is a 51 y.o. fe male with a history of Cerebrovascular accident aborted by administration of thrombolytic agent ([address]    Right MCA occlusion status post endovascular intervention of right MCA occlusion-thrombectomy and status post TNKase  Cardiomyopathy ejection fraction 15 to 20% [facility] with [facility]F exacerbation February 15-Lasix , cardiology follow-up pending,- home meds noted Jardiance and Entresto    Recommendations:  1. Diagnosis: CVA, cardiomyopathy  2. Therapy: Pending  3. Medical  Necessity: As above  4. Support: Clarify, son present they note lives in duplex first-floor set up he lives on [address],  5. Rehab recommendation: Disposition recommendation follow therapy evaluations  6. DVT proph: Currently no anticoagulation    It was my pleasure to evaluate Tarun Anne today. Please call with questions. Humza Cabello. Diego Amaral MD          This note is created with the assistance of a speech recognition program.  While intending to generate a document that actually reflects the content of the visit, the document can still have some errors including those of syntax and sound a like substitutions which may escape proof reading.   In such instances, actual meaning can be extrapolated by contextual diversion

## 2023-02-17 NOTE — PROGRESS NOTES
[address] [facility] [address]     Emergency/Trauma Note    PATIENT NAME: Fawad Cote    Shift date: 2.16.2023  Shift day: Thursday   Shift # 2    Room # 14/14   Name: Fawad Cote            Age: 51 y.o. Gender: female          Religious: None   Place of Buddhism: unknown    Trauma/Incident type: Stroke Alert (RACE)  Admit Date & Time: 2/16/2023 11:38 PM  TRAUMA NAME: None    ADVANCE DIRECTIVES IN CHART? No    NAME OF DECISION MAKER: None    RELATIONSHIP OF DECISION MAKER TO PATIENT: None    PATIENT/EVENT DESCRIPTION:  Fawad Cote is a 51 y.o. female who arrived as a STROKE ALERT (RACE) with stroke-like symptoms. Pt to be admitted to 14/14. SPIRITUAL ASSESSMENT-INTERVENTION-OUTCOME:  Patient appears to be calm and coping. Significant other is in the ED lobby and also appears to be calm and coping. Son of the patient was contacted by medical staff to discuss medical treatment options. Patient has declined significant other coming bedside at this time.  provided space for feelings, thoughts, and concerns. Determined support to be available. Shared update with following  for continued care. PATIENT BELONGINGS:  No belongings noted    ANY BELONGINGS OF SIGNIFICANT VALUE NOTED:  None    REGISTRATION STAFF NOTIFIED? Yes      WHAT IS YOUR SPIRITUAL CARE PLAN FOR THIS PATIENT?:  Chaplains will remain available to offer spiritual and emotional support as needed.       Electronically signed by Shania Cottrell on 2/17/2023 at 2:08 AM.  [facility] [place]  [phone]       02/16/23 2338   Encounter Summary   Service Provided For: Patient;Significant other   Referral/Consult From: Multi-disciplinary team   Support System Significant other   Last Encounter  02/16/23   Complexity of Encounter High   Begin Time 2338   End Time  0010   Total Time Calculated 32 min   Encounter    Type Initial Screen/Assessment   Crisis Type Code Stroke  (RACE)   Assessment/Intervention/Outcome   Assessment Calm;Coping   Intervention Active listening;Explored/Affirmed feelings, thoughts, concerns   Outcome Coping;Engaged in conversation     Electronically signed by Zoë Gonzales on 2/17/2023 at 2:08 AM

## 2023-02-17 NOTE — PROGRESS NOTES
Physical Therapy        Physical Therapy Cancel Note      DATE: 2023    NAME: Juan Hutton  MRN: [id number]   : 1975      Patient not seen this date for Physical Therapy due to:    Unavailable; patient off floor.       Electronically signed by Arthur García PT on 2023 at 11:29 AM

## 2023-02-17 NOTE — CARE COORDINATION
Attempted to meet with pt this am to assess for support and complete PHQ-9 screen. Pt had visitor present. SW explained role, pt was not interested in speaking with SW and cont to talk with visitor. SW went back again and pt off the floor. Will attempt again at another time.

## 2023-02-17 NOTE — ED NOTES
Pt understands risks and benefits of TNK administration. Pt stated that she agrees to receiving medication as long as her son is okay with it. Son then made aware of risks and benefits of medication by RN and MD, and stated he would like her to receive the TNK. Pt then agreed to receive the TNK.  Pt verbalized understanding of the risks and benefits of TNK immediately before medication administration      Tami Pavon RN  02/17/23 7182

## 2023-02-18 ENCOUNTER — APPOINTMENT (OUTPATIENT)
Dept: CT IMAGING | Age: 48
DRG: 024 | End: 2023-02-18
Payer: COMMERCIAL

## 2023-02-18 LAB
ANION GAP SERPL CALCULATED.3IONS-SCNC: 9 MMOL/L (ref 9–17)
BUN SERPL-MCNC: 11 MG/DL (ref 6–20)
CALCIUM SERPL-MCNC: 8.7 MG/DL (ref 8.6–10.4)
CHLORIDE SERPL-SCNC: 104 MMOL/L (ref 98–107)
CO2 SERPL-SCNC: 21 MMOL/L (ref 20–31)
CREAT SERPL-MCNC: 0.91 MG/DL (ref 0.5–0.9)
EKG ATRIAL RATE: 74 BPM
EKG P AXIS: 66 DEGREES
EKG P-R INTERVAL: 150 MS
EKG Q-T INTERVAL: 430 MS
EKG QRS DURATION: 104 MS
EKG QTC CALCULATION (BAZETT): 477 MS
EKG R AXIS: 5 DEGREES
EKG T AXIS: -75 DEGREES
EKG VENTRICULAR RATE: 74 BPM
GFR SERPL CREATININE-BSD FRML MDRD: >60 ML/MIN/1.73M2
GLUCOSE SERPL-MCNC: 90 MG/DL (ref 70–99)
HCT VFR BLD AUTO: 35.3 % (ref 36.3–47.1)
HGB BLD-MCNC: 11.2 G/DL (ref 11.9–15.1)
MCH RBC QN AUTO: 27.6 PG (ref 25.2–33.5)
MCHC RBC AUTO-ENTMCNC: 31.7 G/DL (ref 28.4–34.8)
MCV RBC AUTO: 86.9 FL (ref 82.6–102.9)
NRBC AUTOMATED: 0 PER 100 WBC
PDW BLD-RTO: 15.7 % (ref 11.8–14.4)
PLATELET # BLD AUTO: 290 K/UL (ref 138–453)
PMV BLD AUTO: 9.7 FL (ref 8.1–13.5)
POTASSIUM SERPL-SCNC: 4.1 MMOL/L (ref 3.7–5.3)
RBC # BLD: 4.06 M/UL (ref 3.95–5.11)
SODIUM SERPL-SCNC: 134 MMOL/L (ref 135–144)
WBC # BLD AUTO: 8.1 K/UL (ref 3.5–11.3)

## 2023-02-18 PROCEDURE — 2580000003 HC RX 258: Performed by: STUDENT IN AN ORGANIZED HEALTH CARE EDUCATION/TRAINING PROGRAM

## 2023-02-18 PROCEDURE — 97161 PT EVAL LOW COMPLEX 20 MIN: CPT

## 2023-02-18 PROCEDURE — 6370000000 HC RX 637 (ALT 250 FOR IP): Performed by: STUDENT IN AN ORGANIZED HEALTH CARE EDUCATION/TRAINING PROGRAM

## 2023-02-18 PROCEDURE — 2060000000 HC ICU INTERMEDIATE R&B

## 2023-02-18 PROCEDURE — 36415 COLL VENOUS BLD VENIPUNCTURE: CPT

## 2023-02-18 PROCEDURE — 93010 ELECTROCARDIOGRAM REPORT: CPT | Performed by: INTERNAL MEDICINE

## 2023-02-18 PROCEDURE — 6370000000 HC RX 637 (ALT 250 FOR IP): Performed by: NURSE PRACTITIONER

## 2023-02-18 PROCEDURE — 6360000002 HC RX W HCPCS

## 2023-02-18 PROCEDURE — 6370000000 HC RX 637 (ALT 250 FOR IP)

## 2023-02-18 PROCEDURE — 94761 N-INVAS EAR/PLS OXIMETRY MLT: CPT

## 2023-02-18 PROCEDURE — 97530 THERAPEUTIC ACTIVITIES: CPT

## 2023-02-18 PROCEDURE — 85027 COMPLETE CBC AUTOMATED: CPT

## 2023-02-18 PROCEDURE — 70450 CT HEAD/BRAIN W/O DYE: CPT

## 2023-02-18 PROCEDURE — 99232 SBSQ HOSP IP/OBS MODERATE 35: CPT | Performed by: PSYCHIATRY & NEUROLOGY

## 2023-02-18 PROCEDURE — 80048 BASIC METABOLIC PNL TOTAL CA: CPT

## 2023-02-18 RX ORDER — ENOXAPARIN SODIUM 100 MG/ML
40 INJECTION SUBCUTANEOUS DAILY
Status: DISCONTINUED | OUTPATIENT
Start: 2023-02-18 | End: 2023-02-20 | Stop reason: HOSPADM

## 2023-02-18 RX ADMIN — ASPIRIN 81 MG: 81 TABLET, CHEWABLE ORAL at 08:21

## 2023-02-18 RX ADMIN — FUROSEMIDE 20 MG: 20 TABLET ORAL at 08:21

## 2023-02-18 RX ADMIN — ACETAMINOPHEN 650 MG: 325 TABLET ORAL at 20:03

## 2023-02-18 RX ADMIN — SPIRONOLACTONE 25 MG: 25 TABLET ORAL at 08:21

## 2023-02-18 RX ADMIN — SODIUM CHLORIDE, PRESERVATIVE FREE 10 ML: 5 INJECTION INTRAVENOUS at 08:36

## 2023-02-18 RX ADMIN — ENOXAPARIN SODIUM 40 MG: 100 INJECTION SUBCUTANEOUS at 08:21

## 2023-02-18 RX ADMIN — CARVEDILOL 3.12 MG: 3.12 TABLET, FILM COATED ORAL at 08:21

## 2023-02-18 RX ADMIN — CARVEDILOL 3.12 MG: 3.12 TABLET, FILM COATED ORAL at 17:14

## 2023-02-18 RX ADMIN — SODIUM CHLORIDE, PRESERVATIVE FREE 10 ML: 5 INJECTION INTRAVENOUS at 20:03

## 2023-02-18 RX ADMIN — ATORVASTATIN CALCIUM 80 MG: 80 TABLET, FILM COATED ORAL at 20:02

## 2023-02-18 RX ADMIN — ACETAMINOPHEN 650 MG: 325 TABLET ORAL at 01:13

## 2023-02-18 ASSESSMENT — PAIN DESCRIPTION - LOCATION
LOCATION: HEAD

## 2023-02-18 ASSESSMENT — PAIN DESCRIPTION - PAIN TYPE: TYPE: ACUTE PAIN

## 2023-02-18 ASSESSMENT — PAIN DESCRIPTION - DESCRIPTORS
DESCRIPTORS: ACHING
DESCRIPTORS: ACHING

## 2023-02-18 ASSESSMENT — PAIN SCALES - GENERAL
PAINLEVEL_OUTOF10: 6
PAINLEVEL_OUTOF10: 0
PAINLEVEL_OUTOF10: 6
PAINLEVEL_OUTOF10: 7

## 2023-02-18 ASSESSMENT — PAIN - FUNCTIONAL ASSESSMENT: PAIN_FUNCTIONAL_ASSESSMENT: ACTIVITIES ARE NOT PREVENTED

## 2023-02-18 ASSESSMENT — PAIN DESCRIPTION - ORIENTATION: ORIENTATION: ANTERIOR

## 2023-02-18 NOTE — PROGRESS NOTES
Neuro ICU Progress Note    Patient Name: Kuldip Guerrero  Patient : 1975  Room/Bed: 0119/0119-01  Code Status: Full  Allergies: Allergies   Allergen Reactions    Penicillins        CHIEF COMPLAINT     Facial droop, dysarthria    HPI    Initial presentation (Admitted 2023): The patient is a 51 y.o. female presented with acute onset of left facial drooping, dysarthria and confusion. Patient's son was present with her when symptoms began at 10:26 PM, he states he noted the left side of her face drooping and patient began drooling and \"talking like a baby\". LKW 10:25 PM. Patient with PMHx CHF (EF 15-20% 2022), severe mitral regurgitation, smoker. Recent discharge 2/15/2023 from [facility] for CHF exacerbation. Not on anticoagulation. No prior history of stroke like symptoms. Stroke alert was called on patient arrival, stroke team met patient at [place] [address] directly from the ambulance bay. Initial NIH 6. CT head showing hyperdense right MCA consistent with thrombus. CTA head neck showing acute right middle cerebral artery M1 segment occlusion. Patient received TNKase at 12:42 AM. Patient was determined to be a candidate for thrombectomy and subsequently went for thrombectomy with endovascular neurosurgery. Hospital course:    Last 24 hours: No acute events overnight. Remains hemodynamically stable with improvement in neurological exam.  NIH 1.  2D echo pending. PATIENT HISTORY   Past Medical History:    No past medical history on file. CHF (EF 15-20%), severe mitral regurgitation, smoker    Past Surgical History:    No past surgical history on file.     Social History:   Social History     Socioeconomic History    Marital status: Single     Spouse name: Not on file    Number of children: Not on file    Years of education: Not on file    Highest education level: Not on file   Occupational History    Not on file   Tobacco Use    Smoking status: Former    Smokeless tobacco: Not on file   Substance and Sexual Activity    Alcohol use: No    Drug use: No    Sexual activity: Not on file   Other Topics Concern    Not on file   Social History Narrative    Not on file     Social Determinants of Health     Financial Resource Strain: Not on file   Food Insecurity: Not on file   Transportation Needs: Not on file   Physical Activity: Not on file   Stress: Not on file   Social Connections: Not on file   Intimate Partner Violence: Not on file   Housing Stability: Not on file       Family History:   No family history on file. Allergies:    Penicillins    Medications Prior to Admission:    Medications Prior to Admission: carvedilol (COREG) 3.125 MG tablet, Take 3.125 mg by mouth 2 times daily  albuterol (PROVENTIL) (2.5 MG/3ML) 0.083% nebulizer solution, Inhale 2.5 mg into the lungs in the morning and 2.5 mg at noon and 2.5 mg in the evening and 2.5 mg before bedtime.   empagliflozin (JARDIANCE) 10 MG tablet, Take 10 mg by mouth daily  furosemide (LASIX) 40 MG tablet, Take 40 mg by mouth daily  magnesium oxide (MAG-OX) 400 MG tablet, Take 400 mg by mouth daily  spironolactone (ALDACTONE) 25 MG tablet, Take 25 mg by mouth daily  albuterol sulfate HFA (PROVENTIL;VENTOLIN;PROAIR) 108 (90 Base) MCG/ACT inhaler, Inhale 2 puffs into the lungs every 6 hours as needed  sacubitril-valsartan (ENTRESTO) 24-26 MG per tablet, Take 1 tablet by mouth 2 times daily  ibuprofen (ADVIL;MOTRIN) 800 MG tablet, Take 1 tablet by mouth every 8 hours as needed for Pain    Current Medications:  Current Facility-Administered Medications: enoxaparin (LOVENOX) injection 40 mg, 40 mg, SubCUTAneous, Daily  polyethylene glycol (GLYCOLAX) packet 17 g, 17 g, Oral, Daily PRN  atorvastatin (LIPITOR) tablet 80 mg, 80 mg, Oral, Nightly  sodium chloride flush 0.9 % injection 5-40 mL, 5-40 mL, IntraVENous, 2 times per day  sodium chloride flush 0.9 % injection 5-40 mL, 5-40 mL, IntraVENous, PRN  0.9 % sodium chloride infusion, , IntraVENous, PRN  acetaminophen (TYLENOL) tablet 650 mg, 650 mg, Oral, Q4H PRN  ondansetron (ZOFRAN-ODT) disintegrating tablet 4 mg, 4 mg, Oral, Q8H PRN **OR** ondansetron (ZOFRAN) injection 4 mg, 4 mg, IntraVENous, Q6H PRN  furosemide (LASIX) tablet 20 mg, 20 mg, Oral, Daily  aspirin chewable tablet 81 mg, 81 mg, Oral, Daily  carvedilol (COREG) tablet 3.125 mg, 3.125 mg, Oral, BID WC  spironolactone (ALDACTONE) tablet 25 mg, 25 mg, Oral, Daily    REVIEW OF SYSTEMS     CONSTITUTIONAL: negative for fatigue and malaise   EYES: negative for double vision and photophobia    HEENT: negative for tinnitus and sore throat   RESPIRATORY: negative for cough, shortness of breath   CARDIOVASCULAR: negative for chest pain, palpitations, or syncope   GASTROINTESTINAL: negative for abdominal pain, nausea, vomiting, diarrhea, or constipation    GENITOURINARY: negative for incontinence or retention    MUSCULOSKELETAL: negative for neck or back pain, negative for extremity pain   NEUROLOGICAL: Negative for seizures, headaches, weakness, numbness, confusion   PSYCHIATRIC: negative for agitation, hallucination, SI/HI   SKIN Negative for spontaneous contusions, rashes, or lesions      PHYSICAL EXAM:     BP 96/69   Pulse 73   Temp 98.3 °F (36.8 °C) (Oral)   Resp 14   Ht 5' 9\" (1.753 m)   Wt 194 lb (88 kg)   SpO2 97%   BMI 28.65 kg/m²     PHYSICAL EXAM:  CONSTITUTIONAL:  Awake, alert and oriented x3. Mild dysarthria. HEAD:  normocephalic, atraumatic    EYES:  PERRLA, EOMI.   ENT:  moist mucous membranes   LUNGS:  Equal air entry bilaterally   CARDIOVASCULAR:  normal s1 / s2   ABDOMEN:  Soft, no rigidity   NECK supple, symmetric, no midline tenderness to palpation    BACK without midline tenderness, step-offs or deformities    EXTREMITIES Normal ROM with no deformities   NEUROLOGIC:  Mental Status:  Alert, oriented x3, mild dysarthria             Cranial Nerves:    II, III: No ptosis, visual fields intact.   III: Pupils:  equal, round, reactive to light  III,IV,VI: Extra Ocular Movements: intact, right gaze preference  VII: Facial strength: Normal  IX: Palate:  intact  XI: Shoulder shrug:  intact    Motor Exam:    Drift:  absent  Tone:  normal    Motor exam is symmetrical 5 out of 5 all extremities bilaterally    Sensory:    Touch:    Right Upper Extremity:  normal  Left Upper Extremity: Normal   Right Lower Extremity:  normal  Left Lower Extremity: Normal     SKIN No obvious ecchymosis, rashes, or lesions        LABS AND IMAGING:     RECENT LABS:  CBC with Differential:    Lab Results   Component Value Date/Time    WBC 8.1 02/18/2023 05:48 AM    RBC 4.06 02/18/2023 05:48 AM    HGB 11.2 02/18/2023 05:48 AM    HCT 35.3 02/18/2023 05:48 AM     02/18/2023 05:48 AM    MCV 86.9 02/18/2023 05:48 AM    MCH 27.6 02/18/2023 05:48 AM    MCHC 31.7 02/18/2023 05:48 AM    RDW 15.7 02/18/2023 05:48 AM    LYMPHOPCT 28 02/16/2023 11:44 PM    MONOPCT 9 02/16/2023 11:44 PM    BASOPCT 1 02/16/2023 11:44 PM    MONOSABS 0.76 02/16/2023 11:44 PM    LYMPHSABS 2.30 02/16/2023 11:44 PM    EOSABS 0.49 02/16/2023 11:44 PM    BASOSABS 0.04 02/16/2023 11:44 PM     BMP:    Lab Results   Component Value Date/Time     02/18/2023 05:48 AM    K 4.1 02/18/2023 05:48 AM     02/18/2023 05:48 AM    CO2 21 02/18/2023 05:48 AM    BUN 11 02/18/2023 05:48 AM    CREATININE 0.91 02/18/2023 05:48 AM    CALCIUM 8.7 02/18/2023 05:48 AM    LABGLOM >60 02/18/2023 05:48 AM    GLUCOSE 90 02/18/2023 05:48 AM       RADIOLOGY:     CT head without contrast   Final Result   Subtle small patchy areas of hypoattenuation in the right middle cerebral   artery territory correlate with areas of acute stroke seen on brain MRI done   02/17/2023. No hemorrhagic conversion.          MRI BRAIN WO CONTRAST   Final Result   Areas of restricted diffusion in the right middle cerebral artery territory   involving the insular cortex, right frontal and right parietal lobe   consistent with acute areas of infarct in the right middle cerebral artery   territory. There is a punctate area of restricted diffusion in the medial   aspect of the high left parietal lobe consistent with acute area of infarct   in the left middle cerebral artery territory. An embolic source should be   considered. MRA NECK WO CONTRAST   Final Result   Nonvisualization of the previously identified non flow-limiting focal mid   cervical right internal carotid artery dissection. This may be due to   technical differences. XR CHEST PORTABLE   Final Result   Lungs are clear. Cardiomediastinal silhouette is enlarged. CT HEAD WO CONTRAST   Final Result   1. Hyperdense right middle cerebral artery consistent with thrombus. 2.  No acute intracranial hemorrhage or mass effect. Critical results were called by Dr. Brink Sessions to Dr. Amarilys Asif on   2/17/2023 at 00:04. CTA HEAD NECK W CONTRAST   Final Result   Acute right middle cerebral artery M1 segment occlusion. Critical results were called by Dr. Cuba Weber to Dr. Amarilys Asif on   2/17/2023 at 00:12. IR ANGIOGRAM CAROTID C EREBRAL BILATERAL    (Results Pending)       Labs and Images reviewed with:    [] Keara Shah MD    [] Esa Philip MD  [x] Chan Burns MD  [] there are no new interval images to review. ASSESSMENT AND PLAN:         ASSESSMENT:     This is a 51 y.o. female with LKW 10:25 PM with acute onset left facial droop, dysarthria, and confusion. CT showing hyperdense right middle cerebral artery consistent with thrombus and acute right middle cerebral artery M1 segment occlusion. Patient received TNK at 12:42 AM and patient subsequently had thrombectomy, then was transferred to neuro ICU for post-thrombolytic monitoring. Patient care will be discussed with attending, will reevaluate patient along with attending.      PLAN/MEDICAL DECISION MAKING:    NEUROLOGIC:  -MRI brain without contrast post thrombectomy: Areas of restricted diffusion in the right MCA territory involving the insular cortex, right frontal and right parietal lobe consistent with acute areas of infarct in the right MCA territory. Punctate area of restricted diffusion in the medial aspect of the high left parietal lobe consistent with acute area of infarct in the left MCA territory. - CT head 24 hours post TNK: Stable with no signs of hemorrhage.  - S/p TNK 12:42 AM  - S/p thrombectomy  - ASA 81 daily  - Goal SBP  per NEV  - Focal ICA dissection noted after thrombectomy per NEV  - Give ASA STAT after CT head this morning if unchanged or improved per NEV  - Neuro checks per protocol    CARDIOVASCULAR:  - Hx CHF, EF 15-20% per ECHO 8/22, recent admission on 2/15 for CHF exacerbation  - 2D echo with Definity to rule out LV thrombus, pending  - Home meds: Entresto  - Avoid IVF given severe CHF  - ASA 81 mg daily  - Lipitor 80  - Goal SBP  per NEV  - Cardiology following, appreciate recommendation  - Continue telemetry    PULMONARY:  - Maintaining oxygen saturation on RA  - Continuous pulse oximetry  - Incentive spirometry    RENAL/FLUID/ELECTROLYTE:  - BUN 11/ Creatinine 0.91  - Monitor I&O's  - Replace electrolytes PRN  - Daily BMP    GI/NUTRITION:  NUTRITION:  ADULT DIET;  Regular; Low Fat/Low Chol/High Fiber/ISAURA  - Bowel regimen: Glycolax prn  - GI prophylaxis: None - start lovenox > 24 hours after TNK    ID:  - Afebrile   - WBC 8.1  - Continue to monitor for fevers  - Daily CBC    HEME:   - H&H 11.2/35.3  - Platelets 365  - Daily CBC    ENDOCRINE:  - Continue to monitor blood glucose, goal <180    OTHER:  - PT/OT/ST    PROPHYLAXIS:  Stress ulcer: None    DVT PROPHYLAXIS:  - SCD sleeves - Thigh High   -Aspirin    DISPOSITION: Stable to transfer out of ICU      Rosa Brown MD  Neuro Critical Care Service   Pager [phone]  2/18/2023     8:48 AM

## 2023-02-18 NOTE — PROGRESS NOTES
Endovascular Neurosurgery Progress Note    SUBJECTIVE:     Pt feels good, no complaints after successful thrombectomy, walking around in the ICU    Review of Systems:  CONSTITUTIONAL:  negative for fevers, chills, fatigue and malaise    EYES:  negative for double vision, blurred vision and photophobia     HEENT:  negative for tinnitus, epistaxis and sore throat    RESPIRATORY:  negative for cough, shortness of breath, wheezing    CARDIOVASCULAR:  negative for chest pain, palpitations, syncope, edema    GASTROINTESTINAL:  negative for nausea, vomiting    GENITOURINARY:  negative for incontinence    MUSCULOSKELETAL:  negative for neck or back pain    NEUROLOGICAL:  Negative for weakness and tingling  negative for headaches and dizziness    PSYCHIATRIC:  negative for anxiety      Review of systems otherwise negative. OBJECTIVE:     Vitals:    02/18/23 1605   BP: 102/73   Pulse: 85   Resp: 19   Temp: 98.9 °F (37.2 °C)   SpO2:         General:  Gen: normal habitus, NAD  HEENT: NCAT, mucosa moist  Cvs: RRR, S1 S2 normal  Resp: symmetric unlabored breathing  Abd: s/nd/nt  Ext: no edema  Skin: no lesions seen, warm and dry    Neuro:  Gen: awake and alert, oriented x3. Lang/speech: no aphasia or dysarthria. Follows commands. CN: PERRL, EOMI, VFF, V1-3 intact, mild left face droop, hearing intact, shoulder shrug symmetric, tongue midline  Motor: grossly 5/5 UE and LE b/l  Sense: LT intact in all 4 ext. Coord: FTN and HTS intact b/l  DTR: deferred  Gait: deferred    NIH Stroke Scale:   1a  Level of consciousness: 0 - alert; keenly responsive   1b. LOC questions:  0 - answers both questions correctly   1c. LOC commands: 0 - performs both tasks correctly   2. Best Gaze: 0 - normal   3. Visual: 0 - no visual loss   4. Facial Palsy: 1 - minor paralysis (flattened nasolabial fold, asymmetric on smiling)   5a. Motor left arm: 0 - no drift, limb holds 90 (or 45) degrees for full 10 seconds   5b.   Motor right arm: 0 - no drift, limb holds 90 (or 45) degrees for full 10 seconds   6a. Motor left le - no drift; leg holds 30 degree position for full 5 seconds   6b  Motor right le - no drift; leg holds 30 degree position for full 5 seconds   7. Limb Ataxia: 0 - absent   8. Sensory: 0 - normal; no sensory loss   9. Best Language:  0 - no aphasia, normal   10. Dysarthria: 0 - normal   11. Extinction and Inattention: 0 - no abnormality         Total:   1     MRS: 1      LABS:   Reviewed.   Lab Results   Component Value Date    HGB 11.2 (L) 2023    WBC 8.1 2023     2023     (L) 2023    BUN 11 2023    CREATININE 0.91 (H) 2023    APTT 22.6 2023    INR 1.0 2023      No results found for: COVID19    RADIOLOGY:   Images were personally reviewed including:  Please see dictated Radiology note for further details  Right MCA M-2 inferior division occlusion, TICI score 0   The above lesion was treated 8 fr short sheath, BGC, vamsi aspiration x 1, Trevo stent retriever and aspiration through [address] x 1, 2 passes  Final TICI score 03   Non-flow limiting focal dissection in the right mid-cervical ICA       MRA neck on the 23: R ICA cervical segment small dissection    ASSESSMENT:     50 yo woman with CHF EF of 15%, severe mitral regurgitation, p/w acute left face droop and confusion found to have R ICA cervical mild stenosis/dissection, acute R M1 occlusion, s/p TNK and successful thrombectomy    PLAN:     - doing very well  - stroke is cardioembolic, most likely due to her very low EF of 15% CHF  - patient will need a MORRIS to r/o thrombus, if MORRIS is negative will get hypercoagulable workup as outpatient    - aspirin 81mg daily for stroke and small R ICA cervical disection  - if patient is found to have any thrombus, aspirin can be discontinued and replaced with anticoagulant    - f/u with Dr. Berta Martinez in 2 weeks and Dr Pina Silva in 3 months    Case discussed with Dr. Luda Lutz, patient to follow with Dr Saurav Hartman attending.     Elkin Arvizu MD  Stroke, [facility]  [address] [address] [place]  Electronically signed 2/18/2023 at 5:20 PM

## 2023-02-18 NOTE — PROGRESS NOTES
Physical Therapy  Facility/Department: [address] NEURO ICU  Physical Therapy Initial Assessment    Name: Zack Morris  : 1975  MRN: [id number]  Date of Service: 2023  The patient is a 51 y.o. female presented with acute onset of left facial drooping, dysarthria and confusion. Patient's son was present with her when symptoms began at 10:26 PM, he states he noted the left side of her face drooping and patient began drooling and \"talking like a baby\". LKW 10:25 PM. Patient with PMHx CHF (EF 15-20% 2022), severe mitral regurgitation, smoker. Recent discharge 2/15/2023 from [facility] for CHF exacerbation. Not on anticoagulation. No prior history of stroke like symptoms. Stroke alert was called on patient arrival, stroke team met patient at [place] [address] directly from the ambulance bay. Initial NIH 6. CT head showing hyperdense right MCA consistent with thrombus. CTA head neck showing acute right middle cerebral artery M1 segment occlusion. Patient received TNKase at 12:42 AM. Patient was determined to be a candidate for thrombectomy and subsequently went for thrombectomy with endovascular neurosurgery. Discharge Recommendations:  No further therapy required at discharge. PT Equipment Recommendations  Equipment Needed: No      Patient Diagnosis(es): The encounter diagnosis was Cerebrovascular accident (CVA) due to occlusion of cerebral artery ([address] Past Medical History:  has no past medical history on file. Past Surgical History:  has no past surgical history on file. Assessment   Pt cooperative, motivated; mild L facial droop still noted (roseann when pt smiles) but no sided weakness, no LOB. Pt independent ambulating on flat surfaces and stairs, continued PT not indicated.   DC PT  Therapy Prognosis: Good  Decision Making: Low Complexity  Barriers to Learning: none  No Skilled PT: Independent with functional mobility   Requires PT Follow-Up: No  Activity Tolerance  Activity Tolerance: Patient tolerated treatment well     Plan   Physcial Therapy Plan  General Plan: Discharge with evaluation only  Safety Devices  Type of Devices: Call light within reach, Chair alarm in place, Gait belt, Left in chair, Nurse notified  Restraints  Restraints Initially in Place: No     Restrictions  Restrictions/Precautions  Restrictions/Precautions: Up as Tolerated  Required Braces or Orthoses?: No     Subjective   General  Patient assessed for rehabilitation services?: Yes  Response To Previous Treatment: Not applicable  Family / Caregiver Present: No  Follows Commands: Within Functional Limits  Subjective  Subjective: denies pain         Social/Functional History  Social/Functional History  Lives With: Son (pt's 23 yr old son lives with her in the bottom of a duplex; 3 other sons live in the upper of her duplex; she states she has one son in residential)  Home Layout: One level  Home Access: Stairs to enter with rails  Entrance Stairs - Number of Steps: 6  Entrance Stairs - Rails: Both  Receives Help From: Family  ADL Assistance: Independent  Ambulation Assistance: Independent  Transfer Assistance: Independent  Active : No (pt states she never learned how to drive)  Patient's  Info:  (pt states none of her sons drive--no one has a car)  Mode of Transportation: Friends  Occupation: Unemployed  Vision/Hearing  Vision  Vision: Within Functional Limits  Hearing  Hearing: Within functional limits    Cognition   Orientation  Overall Orientation Status: Within Functional Limits  Orientation Level: Oriented X4  Cognition  Overall Cognitive Status: WFL     Objective   Heart Rate: 79  BP: 102/76 (pt's BP initially 92/63 in bed; 89/64 dangling; 102/76 after ambulating, pt standing; pt denied light headedness, dizziness)  BP Location: Left upper arm  BP Method: Automatic  MAP (Calculated): 85  Resp: 17  SpO2: 97 %  O2 Device: None (Room air)     Observation/Palpation  Posture: Good        AROM RLE (degrees)  RLE AROM: WFL  AROM LLE (degrees)  LLE AROM : WFL  AROM RUE (degrees)  RUE AROM : WFL  AROM LUE (degrees)  LUE AROM : WFL  Strength RLE  Strength RLE: WFL  Strength LLE  Strength LLE: WFL  Strength RUE  Strength RUE: WFL  Strength LUE  Strength LUE: WFL           Bed mobility  Rolling to Left: Independent  Supine to Sit: Independent  Scooting: Independent  Transfers  Sit to Stand: Independent  Stand to Sit: Independent  Bed to Chair: Independent  Stand Pivot Transfers: Independent  Ambulation  Surface: Level tile  Device: No Device  Assistance: Independent  Gait Deviations: None  Distance: 240'x2 with seated rest break in between  [place]?: Yes  Stairs  # Steps : 10  Rails: Right ascending  Device: No Device  Assistance: Independent     Balance  Posture: Good  Sitting - Static: Good  Sitting - Dynamic: Good  Standing - Static: Good  Standing - Dynamic: Good  A/AROM Exercises: AROM x 4; ankle pumps    AM-PAC Score  AM-PAC Inpatient Mobility Raw Score : 24 (02/18/23 1000)  AM-PAC Inpatient T-Scale Score : 61.14 (02/18/23 1000)  Mobility Inpatient CMS 0-100% Score: 0 (02/18/23 1000)  Mobility Inpatient CMS G-Code Modifier : CH (02/18/23 1000)        Goals  Short Term Goals  Time Frame for Short Term Goals: 1 visit  Short Term Goal 1: evaluate and give recommendations: pt is independent; continued PT not necessary; DC PT  Patient Goals   Patient Goals : return home independently       Education  Patient Education  Education Given To: Patient  Education Provided: Role of Therapy;Plan of Care  Education Method: Verbal  Barriers to Learning: None  Education Outcome: Verbalized understanding;Demonstrated understanding      Therapy Time   Individual Concurrent Group Co-treatment   Time In 0902         Time Out 0947         Minutes 45         Timed Code Treatment Minutes: [facility]       Elisa Medina

## 2023-02-18 NOTE — CARE COORDINATION
PHQ-9  Pt presented with facial droop, dysarthria, CVA  Met with pt his date was alert and oriented  Pt states she she has 5 sons. 3 sons lives with her and 1 is incarcerated. Pt states she has a lot of family support. Pt denies having any suicidal ideations/ depression      Patient Health Questionnaire-9 (PHQ-9)    Over the last 2 weeks, how often have you been bothered by any of the following problems? 1. Little Interest or pleasure in doing things? [x] Not at all  [] Several Days  [] More than half the day  []  Nearly every day    2. Feeling down, depressed or hopeless? [x] Not at all  [] Several Days  [] More than half the day  []  Nearly every day    3. Trouble falling or staying asleep, or sleeping too much? [x] Not at all  [] Several Days  [] More than half the day  []  Nearly every day    4. Feeling tired or having little energy? [x] Not at all  [] Several Days  [] More than half the day  []  Nearly every day    5. Poor apettite or overeating? [x] Not at all  [] Several Days  [] More than half the day  []  Nearly every day    6. Feeling bad about yourself-or that you are a failure or have let yourself or your family down? [x] Not at all  [] Several Days  [] More than half the day  []  Nearly every day    7. Trouble concentrating on things, such as reading the newspaper or watching television? [x] Not at all  [] Several Days  [] More than half the day  []  Nearly every day    8. Moving or speaking so slowly that other people could have noticed? Or the opposite-being so fidgety or restless that you have been moving around a lot more than usual?   [x] Not at all  [] Several Days  [] More than half the day  []  Nearly every day    9. Thoughts that you would be better off dead or of hurting yourself in some way?    [x] Not at all  [] Several Days  [] More than half the day  []  Nearly every day    Total Score: 0    If you checked off any problems, how difficult have these problems made it for you to do your work, take care of things at home, or get along with other people?    [x] Not difficult at all  [] Somewhat Difficult  [] Very Difficult  []  Extremely Difficult

## 2023-02-18 NOTE — PROGRESS NOTES
[facility]  Progress Note                   Date:   2/18/2023  Patient name: Vera Delarosa  Date of admission:  2/16/2023 11:38 PM  MRN:   [id number]  YOB: 1975  PCP: No primary care provider on file. Reason for Admission: Cerebrovascular accident (CVA) due to occlusion of cerebral artery ([facility] [address] [I63.50]  Cerebrovascular accident aborted by administration of thrombolytic agent ([facility] [address] [I63.9]    Subjective:       Clinical Changes /Abnormalities: Seen & examined in room after discussion with RN. No acute CV issues/concerns overnight. Labs, vitals, & tele reviewed. Review of Systems    Medications:   Scheduled Meds:   enoxaparin  40 mg SubCUTAneous Daily    atorvastatin  80 mg Oral Nightly    sodium chloride flush  5-40 mL IntraVENous 2 times per day    furosemide  20 mg Oral Daily    aspirin  81 mg Oral Daily    carvedilol  3.125 mg Oral BID WC    spironolactone  25 mg Oral Daily     Continuous Infusions:   sodium chloride       CBC:   Recent Labs     02/16/23 2344 02/17/23  0856 02/18/23  0548   WBC 8.3 9.8 8.1   HGB 12.6 12.4 11.2*    301 290     BMP:    Recent Labs     02/16/23  2342 02/16/23  2344 02/18/23  0548   NA  --  135 134*   K  --  4.5 4.1   CL  --  101 104   CO2  --  20 21   BUN  --  21* 11   CREATININE 0.97 0.97* 0.91*   GLUCOSE  --  119* 90     Hepatic:No results for input(s): AST, ALT, ALB, BILITOT, ALKPHOS in the last 72 hours. Troponin:   Recent Labs     02/16/23 2344   TROPHS 12     BNP: No results for input(s): BNP in the last 72 hours.   Lipids:   Recent Labs     02/17/23  0856   CHOL 159   HDL 39*     INR:   Recent Labs     02/16/23 2344   INR 1.0       Objective:   Vitals: /76 Comment: pt's BP initially 92/63 in bed; 89/64 dangling; 102/76 after ambulating, pt standing; pt denied light headedness, dizziness  Pulse 79   Temp 98.3 °F (36.8 °C) (Oral)   Resp 17   Ht 5' 9\" (1.753 m)   Wt 194 lb (88 kg)   SpO2 97%   BMI 28.65 kg/m² General appearance: alert and cooperative with exam  HEENT: Head: Normocephalic, no lesions, without obvious abnormality. Neck:no JVD, trachea midline, no adenopathy  Lungs: Clear to auscultation  Heart: Regular rate and rhythm, s1/s2 auscultated, no murmurs  Abdomen: soft, non-tender, bowel sounds active  Extremities: no edema  Neurologic: not done    ECHO  TTE   08/05/2022:   LVEF 15-20%, severe mitral regurgitation, LVIDd 6.2 cm     TTE   02/22/2021:   LVEF 15-20%, LVIDd 6.5 cm, severe mitral regurgitation, moderate TR     Cardiac catheterization   03/08/2021:   Angiographically normal coronary arteries LVEDP 20-25%        Assessment / Acute Cardiac Problems:   Nonischemic cardiomyopathy EF 15% on GDMT follows with Dr. Lexus Cisneros  Acute embolic stroke affecting right MCA and thrombus in right MCA status post TNKase status post thrombectomy  Hypertension  History of chronic smoking  History of alcoholism    Patient Active Problem List:     Cerebrovascular accident aborted by administration of thrombolytic agent [facility])     Cerebrovascular accident (CVA) due to occlusion of cerebral artery ([facility] [address]      Plan of Treatment:   Stable. Follows with Dr. Prasanth Mckay at [facility] for her [address] with plans for cardiac MRI in several months to reassess  LVEF & valvular disease. Currently euvolemic. Continue GDMTas able given soft BP. Echo with definity to r/o LV thrombus pending. Can plan for MORRIS on Monday if recommended by neurology and pending TTE.   Continue stroke management per neuro    Electronically signed by AXEL Barrios CNP on 2/18/2023 at 10:01 AM  [address]  [phone]

## 2023-02-19 LAB
ABSOLUTE EOS #: 0.46 K/UL (ref 0–0.44)
ABSOLUTE IMMATURE GRANULOCYTE: <0.03 K/UL (ref 0–0.3)
ABSOLUTE LYMPH #: 2.23 K/UL (ref 1.1–3.7)
ABSOLUTE MONO #: 0.54 K/UL (ref 0.1–1.2)
ANION GAP SERPL CALCULATED.3IONS-SCNC: 9 MMOL/L (ref 9–17)
BASOPHILS # BLD: 0 % (ref 0–2)
BASOPHILS ABSOLUTE: 0.03 K/UL (ref 0–0.2)
BUN SERPL-MCNC: 11 MG/DL (ref 6–20)
CALCIUM SERPL-MCNC: 9.1 MG/DL (ref 8.6–10.4)
CHLORIDE SERPL-SCNC: 103 MMOL/L (ref 98–107)
CO2 SERPL-SCNC: 22 MMOL/L (ref 20–31)
CREAT SERPL-MCNC: 0.76 MG/DL (ref 0.5–0.9)
EOSINOPHILS RELATIVE PERCENT: 7 % (ref 1–4)
GFR SERPL CREATININE-BSD FRML MDRD: >60 ML/MIN/1.73M2
GLUCOSE SERPL-MCNC: 89 MG/DL (ref 70–99)
HCT VFR BLD AUTO: 34.2 % (ref 36.3–47.1)
HGB BLD-MCNC: 11.3 G/DL (ref 11.9–15.1)
IMMATURE GRANULOCYTES: 0 %
LV EF: 23 %
LVEF MODALITY: NORMAL
LYMPHOCYTES # BLD: 32 % (ref 24–43)
MCH RBC QN AUTO: 27.4 PG (ref 25.2–33.5)
MCHC RBC AUTO-ENTMCNC: 33 G/DL (ref 28.4–34.8)
MCV RBC AUTO: 83 FL (ref 82.6–102.9)
MONOCYTES # BLD: 8 % (ref 3–12)
NRBC AUTOMATED: 0 PER 100 WBC
PDW BLD-RTO: 15.7 % (ref 11.8–14.4)
PLATELET # BLD AUTO: 279 K/UL (ref 138–453)
PMV BLD AUTO: 9.9 FL (ref 8.1–13.5)
POTASSIUM SERPL-SCNC: 4 MMOL/L (ref 3.7–5.3)
RBC # BLD: 4.12 M/UL (ref 3.95–5.11)
RBC # BLD: ABNORMAL 10*6/UL
SEG NEUTROPHILS: 53 % (ref 36–65)
SEGMENTED NEUTROPHILS ABSOLUTE COUNT: 3.76 K/UL (ref 1.5–8.1)
SODIUM SERPL-SCNC: 134 MMOL/L (ref 135–144)
WBC # BLD AUTO: 7 K/UL (ref 3.5–11.3)

## 2023-02-19 PROCEDURE — 6370000000 HC RX 637 (ALT 250 FOR IP): Performed by: STUDENT IN AN ORGANIZED HEALTH CARE EDUCATION/TRAINING PROGRAM

## 2023-02-19 PROCEDURE — 94761 N-INVAS EAR/PLS OXIMETRY MLT: CPT

## 2023-02-19 PROCEDURE — 6370000000 HC RX 637 (ALT 250 FOR IP): Performed by: NURSE PRACTITIONER

## 2023-02-19 PROCEDURE — 93306 TTE W/DOPPLER COMPLETE: CPT

## 2023-02-19 PROCEDURE — 2060000000 HC ICU INTERMEDIATE R&B

## 2023-02-19 PROCEDURE — 2580000003 HC RX 258: Performed by: STUDENT IN AN ORGANIZED HEALTH CARE EDUCATION/TRAINING PROGRAM

## 2023-02-19 PROCEDURE — 80048 BASIC METABOLIC PNL TOTAL CA: CPT

## 2023-02-19 PROCEDURE — 6360000002 HC RX W HCPCS

## 2023-02-19 PROCEDURE — 99232 SBSQ HOSP IP/OBS MODERATE 35: CPT | Performed by: PSYCHIATRY & NEUROLOGY

## 2023-02-19 PROCEDURE — 6370000000 HC RX 637 (ALT 250 FOR IP)

## 2023-02-19 PROCEDURE — 36415 COLL VENOUS BLD VENIPUNCTURE: CPT

## 2023-02-19 PROCEDURE — 85025 COMPLETE CBC W/AUTO DIFF WBC: CPT

## 2023-02-19 RX ADMIN — SODIUM CHLORIDE, PRESERVATIVE FREE 10 ML: 5 INJECTION INTRAVENOUS at 10:01

## 2023-02-19 RX ADMIN — SODIUM CHLORIDE, PRESERVATIVE FREE 10 ML: 5 INJECTION INTRAVENOUS at 21:00

## 2023-02-19 RX ADMIN — ENOXAPARIN SODIUM 40 MG: 100 INJECTION SUBCUTANEOUS at 10:00

## 2023-02-19 RX ADMIN — ATORVASTATIN CALCIUM 80 MG: 80 TABLET, FILM COATED ORAL at 20:39

## 2023-02-19 RX ADMIN — SPIRONOLACTONE 25 MG: 25 TABLET ORAL at 10:00

## 2023-02-19 RX ADMIN — ACETAMINOPHEN 650 MG: 325 TABLET ORAL at 20:39

## 2023-02-19 RX ADMIN — CARVEDILOL 3.12 MG: 3.12 TABLET, FILM COATED ORAL at 10:00

## 2023-02-19 RX ADMIN — ASPIRIN 81 MG: 81 TABLET, CHEWABLE ORAL at 10:00

## 2023-02-19 RX ADMIN — FUROSEMIDE 20 MG: 20 TABLET ORAL at 10:00

## 2023-02-19 ASSESSMENT — PAIN SCALES - GENERAL
PAINLEVEL_OUTOF10: 0

## 2023-02-19 ASSESSMENT — PAIN - FUNCTIONAL ASSESSMENT: PAIN_FUNCTIONAL_ASSESSMENT: ACTIVITIES ARE NOT PREVENTED

## 2023-02-19 ASSESSMENT — PAIN DESCRIPTION - DESCRIPTORS: DESCRIPTORS: ACHING

## 2023-02-19 NOTE — PROGRESS NOTES
Endovascular Neurosurgery Progress Note    SUBJECTIVE:     Pt feels good, no complaints after successful thrombectomy, walking around in the ICU    Review of Systems:  CONSTITUTIONAL:  negative for fevers, chills, fatigue and malaise    EYES:  negative for double vision, blurred vision and photophobia     HEENT:  negative for tinnitus, epistaxis and sore throat    RESPIRATORY:  negative for cough, shortness of breath, wheezing    CARDIOVASCULAR:  negative for chest pain, palpitations, syncope, edema    GASTROINTESTINAL:  negative for nausea, vomiting    GENITOURINARY:  negative for incontinence    MUSCULOSKELETAL:  negative for neck or back pain    NEUROLOGICAL:  Negative for weakness and tingling  negative for headaches and dizziness    PSYCHIATRIC:  negative for anxiety      Review of systems otherwise negative. OBJECTIVE:     Vitals:    02/19/23 0958   BP: 101/75   Pulse: 78   Resp: 13   Temp: 98.1 °F (36.7 °C)   SpO2: 98%        General:  Gen: normal habitus, NAD  HEENT: NCAT, mucosa moist  Cvs: RRR, S1 S2 normal  Resp: symmetric unlabored breathing  Abd: s/nd/nt  Ext: no edema  Skin: no lesions seen, warm and dry    Neuro:  Gen: awake and alert, oriented x3. Lang/speech: no aphasia or dysarthria. Follows commands. CN: PERRL, EOMI, VFF, V1-3 intact, mild left face droop, hearing intact, shoulder shrug symmetric, tongue midline  Motor: grossly 5/5 UE and LE b/l  Sense: LT intact in all 4 ext. Coord: FTN and HTS intact b/l  DTR: deferred  Gait: deferred    NIH Stroke Scale:   1a  Level of consciousness: 0 - alert; keenly responsive   1b. LOC questions:  0 - answers both questions correctly   1c. LOC commands: 0 - performs both tasks correctly   2. Best Gaze: 0 - normal   3. Visual: 0 - no visual loss   4. Facial Palsy: 0 - normal symmetric movement   5a. Motor left arm: 0 - no drift, limb holds 90 (or 45) degrees for full 10 seconds   5b.   Motor right arm: 0 - no drift, limb holds 90 (or 45) degrees for full 10 seconds   6a. Motor left le - no drift; leg holds 30 degree position for full 5 seconds   6b  Motor right le - no drift; leg holds 30 degree position for full 5 seconds   7. Limb Ataxia: 0 - absent   8. Sensory: 0 - normal; no sensory loss   9. Best Language:  0 - no aphasia, normal   10. Dysarthria: 0 - normal   11. Extinction and Inattention: 0 - no abnormality         Total:   0     MRS: 0      LABS:   Reviewed.   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       MRA neck on the 23: R ICA cervical segment small dissection    ASSESSMENT:     50 yo woman with CHF EF of 15%, severe mitral regurgitation, p/w acute left face droop and confusion found to have R ICA cervical mild stenosis/dissection, acute R M1 occlusion, s/p TNK and successful thrombectomy    PLAN:     - doing very well  - stroke is cardioembolic, most likely due to her very low EF of 15% CHF  - waiting for MORRIS to be done by cardiology to r/o thrombus    - aspirin 81mg daily for stroke and small R ICA cervical disection  - if patient is found to have any thrombus, aspirin can be discontinued and replaced with anticoagulant  - consider loop recorder    - can be discharged from endovascular standpoint after MORRIS    - f/u with Dr. Suma Gee in 2 weeks and Dr Dianna Choi in 3 months    Case discussed with Dr. Diego Aleman, patient to follow with Dr Delicia Hester attending.     Lucio Croft MD  Stroke, [facility]  [address]  Electronically signed 2/19/2023 at 4:41 PM

## 2023-02-19 NOTE — CARE COORDINATION
Case Management Assessment  Initial Evaluation    Date/Time of Evaluation: 2/19/2023 2:48 PM  Assessment Completed by: Bridgett Gaytan RN    If patient is discharged prior to next notation, then this note serves as note for discharge by case management. Patient Name: Ambrosio Sosa                   YOB: 1975  Diagnosis: Cerebrovascular accident (CVA) due to occlusion of cerebral artery ([facility] [address] [I63.50]  Cerebrovascular accident aborted by administration of thrombolytic agent [facility]) [I63.9]                   Date / Time: 2/16/2023 11:38 PM    Patient Admission Status: Inpatient   Readmission Risk (Low < 19, Mod (19-27), High > 27): Readmission Risk Score: 13.1    Current PCP: No primary care provider on file. PCP verified by CM? No (No PCP. Patient declines PCP list when offered.)    Chart Reviewed: Yes      History Provided by: Patient  Patient Orientation: Alert and Oriented    Patient Cognition: Alert    Hospitalization in the last 30 days (Readmission):  No    If yes, Readmission Assessment in CM Navigator will be completed. Advance Directives:      Code Status: Full Code   Patient's Primary Decision Maker is: Legal Next of Kin      Discharge Planning:    Patient lives with: Children Type of Home: House  Primary Care Giver: Self  Patient Support Systems include: Children   Current Financial resources:    Current community resources: None  Current services prior to admission: None            Current DME:              Type of Home Care services:  None    ADLS  Prior functional level: Independent in ADLs/IADLs  Current functional level:      PT AM-PAC: 24 /24  OT AM-PAC:   /24    Family can provide assistance at DC: Yes  Would you like Case Management to discuss the discharge plan with any other family members/significant others, and if so, who?     Plans to Return to Present Housing: Yes  Other Identified Issues/Barriers to RETURNING to current housing: N/A  Potential Assistance needed at discharge: N/A            Potential DME:    Patient expects to discharge to: [facility] for transportation at discharge: Family    Financial    Payor: [address] / Plan: Russ Herbert / Product Type: *No Product type* /     Does insurance require precert for SNF: Yes    Potential assistance Purchasing Medications: No  Meds-to-Beds request: Yes    No Pharmacies Listed    Notes:    Factors facilitating achievement of predicted outcomes: Family support    Barriers to discharge: Limited insight into deficits and Decreased endurance    Additional Case Management Notes: CM spoke with patient at bedside to discuss transitional planning. CM role explained and patient demographic information verified. Patient plans to return home with her son at discharge. Plan for MORRIS tomorrow. The Plan for Transition of Care is related to the following treatment goals of Cerebrovascular accident (CVA) due to occlusion of cerebral artery ([address] [I63.50]  Cerebrovascular accident aborted by administration of thrombolytic agent ([address] [B76.2]    IF APPLICABLE: The Patient and/or patient representative Chandu Lebron and her family were provided with a choice of provider and agrees with the discharge plan. Freedom of choice list with basic dialogue that supports the patient's individualized plan of care/goals and shares the quality data associated with the providers was provided to: Patient   Patient Representative Name:       The Patient and/or Patient Representative Agree with the Discharge Plan?  Yes    Cassi Edwards RN  Case Management Department  Ph: [phone]

## 2023-02-19 NOTE — PROGRESS NOTES
[place] [facility]  Progress Note                   Date:   2/19/2023  Patient name: Rajeev Lew  Date of admission:  2/16/2023 11:38 PM  MRN:   [id number]  YOB: 1975  PCP: No primary care provider on file. Reason for Admission: Cerebrovascular accident (CVA) due to occlusion of cerebral artery ([facility] [address] [I63.50]  Cerebrovascular accident aborted by administration of thrombolytic agent ([address] [I63.9]    Subjective:       Clinical Changes /Abnormalities: Seen & examined in room. No acute CV issues/concerns overnight. Labs, vitals, & tele reviewed. Echo pending    Review of Systems    Medications:   Scheduled Meds:   enoxaparin  40 mg SubCUTAneous Daily    atorvastatin  80 mg Oral Nightly    sodium chloride flush  5-40 mL IntraVENous 2 times per day    furosemide  20 mg Oral Daily    aspirin  81 mg Oral Daily    carvedilol  3.125 mg Oral BID WC    spironolactone  25 mg Oral Daily     Continuous Infusions:   sodium chloride       CBC:   Recent Labs     02/17/23  0856 02/18/23  0548 02/19/23  0747   WBC 9.8 8.1 7.0   HGB 12.4 11.2* 11.3*    290 279       BMP:    Recent Labs     02/16/23  2344 02/18/23  0548 02/19/23  0747    134* 134*   K 4.5 4.1 4.0    104 103   CO2 20 21 22   BUN 21* 11 11   CREATININE 0.97* 0.91* 0.76   GLUCOSE 119* 90 89       Hepatic:No results for input(s): AST, ALT, ALB, BILITOT, ALKPHOS in the last 72 hours. Troponin:   Recent Labs     02/16/23  2344   TROPHS 12       BNP: No results for input(s): BNP in the last 72 hours. Lipids:   Recent Labs     02/17/23  0856   CHOL 159   HDL 39*       INR:   Recent Labs     02/16/23 2344   INR 1.0         Objective:   Vitals: /68   Pulse 75   Temp 98.4 °F (36.9 °C) (Oral)   Resp 15   Ht 5' 9\" (1.753 m)   Wt 194 lb (88 kg)   SpO2 97%   BMI 28.65 kg/m²   General appearance: alert and cooperative with exam  HEENT: Head: Normocephalic, no lesions, without obvious abnormality.   Neck:no JVD, trachea midline, no adenopathy  Lungs: Clear to auscultation  Heart: Regular rate and rhythm, s1/s2 auscultated, no murmurs  Abdomen: soft, non-tender, bowel sounds active  Extremities: no edema  Neurologic: not done    ECHO  TTE   08/05/2022:   LVEF 15-20%, severe mitral regurgitation, LVIDd 6.2 cm     TTE   02/22/2021:   LVEF 15-20%, LVIDd 6.5 cm, severe mitral regurgitation, moderate TR     Cardiac catheterization   03/08/2021:   Angiographically normal coronary arteries LVEDP 20-25%        Assessment / Acute Cardiac Problems:   Nonischemic cardiomyopathy EF 15% on GDMT follows with Dr. Jeana Lee  Acute embolic stroke affecting right MCA and thrombus in right MCA status post TNKase status post thrombectomy  Hypertension  History of chronic smoking  History of alcoholism    Patient Active Problem List:     Cerebrovascular accident aborted by administration of thrombolytic agent [facility])     Cerebrovascular accident (CVA) due to occlusion of cerebral artery ([address]      Plan of Treatment:   Stable. Follows with Dr. Zulema Chin at [facility] for her [address] with plans for cardiac MRI in several months to reassess  LVEF & valvular disease. Currently euvolemic. Continue GDMTas able given soft BP. Echo with definity to r/o LV thrombus pending. Can plan for MORRIS on Monday if recommended by neurology and pending TTE.   Continue stroke management per neuro    Electronically signed by AXEL Simeon CNP on 2/19/2023 at 8:56 AM  [address]  [phone]

## 2023-02-19 NOTE — PROGRESS NOTES
Neuro ICU Progress Note    Patient Name: Cele Cornejo  Patient : 1975  Room/Bed: 0119/0119-01  Code Status: Full  Allergies: Allergies   Allergen Reactions    Penicillins        CHIEF COMPLAINT     Facial droop, dysarthria    HPI    Initial presentation (Admitted 2023): The patient is a 51 y.o. female presented with acute onset of left facial drooping, dysarthria and confusion. Patient's son was present with her when symptoms began at 10:26 PM, he states he noted the left side of her face drooping and patient began drooling and \"talking like a baby\". LKW 10:25 PM. Patient with PMHx CHF (EF 15-20% 2022), severe mitral regurgitation, smoker. Recent discharge 2/15/2023 from [facility] for CHF exacerbation. Not on anticoagulation. No prior history of stroke like symptoms. Stroke alert was called on patient arrival, stroke team met patient at [place] [id number] directly from the ambulance bay. Initial NIH 6. CT head showing hyperdense right MCA consistent with thrombus. CTA head neck showing acute right middle cerebral artery M1 segment occlusion. Patient received TNKase at 12:42 AM. Patient was determined to be a candidate for thrombectomy and subsequently went for thrombectomy with endovascular neurosurgery. Hospital course:    : Remained hemodynamically stable with improvement in neurological exam.  NIH 1. Stroke work-up ongoing. Last 24 hours: No acute events overnight. Remains hemodynamically stable. TTE pending, will attempt to complete today. PATIENT HISTORY   Past Medical History:    No past medical history on file. CHF (EF 15-20%), severe mitral regurgitation, smoker    Past Surgical History:    No past surgical history on file.     Social History:   Social History     Socioeconomic History    Marital status: Single     Spouse name: Not on file    Number of children: Not on file    Years of education: Not on file    Highest education level: Not on file   Occupational History    Not on file   Tobacco Use    Smoking status: Former    Smokeless tobacco: Not on file   Substance and Sexual Activity    Alcohol use: No    Drug use: No    Sexual activity: Not on file   Other Topics Concern    Not on file   Social History Narrative    Not on file     Social Determinants of Health     Financial Resource Strain: Not on file   Food Insecurity: Not on file   Transportation Needs: Not on file   Physical Activity: Not on file   Stress: Not on file   Social Connections: Not on file   Intimate Partner Violence: Not on file   Housing Stability: Not on file       Family History:   No family history on file. Allergies:    Penicillins    Medications Prior to Admission:    Medications Prior to Admission: carvedilol (COREG) 3.125 MG tablet, Take 3.125 mg by mouth 2 times daily  albuterol (PROVENTIL) (2.5 MG/3ML) 0.083% nebulizer solution, Inhale 2.5 mg into the lungs in the morning and 2.5 mg at noon and 2.5 mg in the evening and 2.5 mg before bedtime.   empagliflozin (JARDIANCE) 10 MG tablet, Take 10 mg by mouth daily  furosemide (LASIX) 40 MG tablet, Take 40 mg by mouth daily  magnesium oxide (MAG-OX) 400 MG tablet, Take 400 mg by mouth daily  spironolactone (ALDACTONE) 25 MG tablet, Take 25 mg by mouth daily  albuterol sulfate HFA (PROVENTIL;VENTOLIN;PROAIR) 108 (90 Base) MCG/ACT inhaler, Inhale 2 puffs into the lungs every 6 hours as needed  sacubitril-valsartan (ENTRESTO) 24-26 MG per tablet, Take 1 tablet by mouth 2 times daily  ibuprofen (ADVIL;MOTRIN) 800 MG tablet, Take 1 tablet by mouth every 8 hours as needed for Pain    Current Medications:  Current Facility-Administered Medications: enoxaparin (LOVENOX) injection 40 mg, 40 mg, SubCUTAneous, Daily  polyethylene glycol (GLYCOLAX) packet 17 g, 17 g, Oral, Daily PRN  atorvastatin (LIPITOR) tablet 80 mg, 80 mg, Oral, Nightly  sodium chloride flush 0.9 % injection 5-40 mL, 5-40 mL, IntraVENous, 2 times per day  sodium chloride flush 0.9 % injection 5-40 mL, 5-40 mL, IntraVENous, PRN  0.9 % sodium chloride infusion, , IntraVENous, PRN  acetaminophen (TYLENOL) tablet 650 mg, 650 mg, Oral, Q4H PRN  ondansetron (ZOFRAN-ODT) disintegrating tablet 4 mg, 4 mg, Oral, Q8H PRN **OR** ondansetron (ZOFRAN) injection 4 mg, 4 mg, IntraVENous, Q6H PRN  furosemide (LASIX) tablet 20 mg, 20 mg, Oral, Daily  aspirin chewable tablet 81 mg, 81 mg, Oral, Daily  carvedilol (COREG) tablet 3.125 mg, 3.125 mg, Oral, BID WC  spironolactone (ALDACTONE) tablet 25 mg, 25 mg, Oral, Daily    REVIEW OF SYSTEMS     CONSTITUTIONAL: negative for fatigue and malaise   EYES: negative for double vision and photophobia    HEENT: negative for tinnitus and sore throat   RESPIRATORY: negative for cough, shortness of breath   CARDIOVASCULAR: negative for chest pain, palpitations, or syncope   GASTROINTESTINAL: negative for abdominal pain, nausea, vomiting, diarrhea, or constipation    GENITOURINARY: negative for incontinence or retention    MUSCULOSKELETAL: negative for neck or back pain, negative for extremity pain   NEUROLOGICAL: Negative for seizures, headaches, weakness, numbness, confusion   PSYCHIATRIC: negative for agitation, hallucination, SI/HI   SKIN Negative for spontaneous contusions, rashes, or lesions      PHYSICAL EXAM:     BP 90/62   Pulse 80   Temp 98.7 °F (37.1 °C) (Oral)   Resp 16   Ht 5' 9\" (1.753 m)   Wt 194 lb (88 kg)   SpO2 100%   BMI 28.65 kg/m²     PHYSICAL EXAM:  CONSTITUTIONAL:  Awake, alert and oriented x3. HEAD:  normocephalic, atraumatic    EYES:  PERRLA, EOMI.   ENT:  moist mucous membranes   LUNGS:  Equal air entry bilaterally   CARDIOVASCULAR:  normal s1 / s2   ABDOMEN:  Soft, no rigidity   NECK supple, symmetric, no midline tenderness to palpation    BACK without midline tenderness, step-offs or deformities    EXTREMITIES Normal ROM with no deformities   NEUROLOGIC:  Mental Status:  Alert, oriented x3, no aphasia or dysarthria noted. Cranial Nerves:    II, III: No ptosis, visual fields intact. III: Pupils:  equal, round, reactive to light  III,IV,VI: Extra Ocular Movements: intact, right gaze preference  VII: Facial strength: Normal  IX: Palate:  intact  XI: Shoulder shrug:  intact    Motor Exam:    Drift:  absent  Tone:  normal    Motor exam is symmetrical 5 out of 5 all extremities bilaterally    Sensory:    Touch:    Right Upper Extremity:  normal  Left Upper Extremity: Normal   Right Lower Extremity:  normal  Left Lower Extremity: Normal     SKIN No obvious ecchymosis, rashes, or lesions        LABS AND IMAGING:     RECENT LABS:  CBC with Differential:    Lab Results   Component Value Date/Time    WBC 8.1 02/18/2023 05:48 AM    RBC 4.06 02/18/2023 05:48 AM    HGB 11.2 02/18/2023 05:48 AM    HCT 35.3 02/18/2023 05:48 AM     02/18/2023 05:48 AM    MCV 86.9 02/18/2023 05:48 AM    MCH 27.6 02/18/2023 05:48 AM    MCHC 31.7 02/18/2023 05:48 AM    RDW 15.7 02/18/2023 05:48 AM    LYMPHOPCT 28 02/16/2023 11:44 PM    MONOPCT 9 02/16/2023 11:44 PM    BASOPCT 1 02/16/2023 11:44 PM    MONOSABS 0.76 02/16/2023 11:44 PM    LYMPHSABS 2.30 02/16/2023 11:44 PM    EOSABS 0.49 02/16/2023 11:44 PM    BASOSABS 0.04 02/16/2023 11:44 PM     BMP:    Lab Results   Component Value Date/Time     02/18/2023 05:48 AM    K 4.1 02/18/2023 05:48 AM     02/18/2023 05:48 AM    CO2 21 02/18/2023 05:48 AM    BUN 11 02/18/2023 05:48 AM    CREATININE 0.91 02/18/2023 05:48 AM    CALCIUM 8.7 02/18/2023 05:48 AM    LABGLOM >60 02/18/2023 05:48 AM    GLUCOSE 90 02/18/2023 05:48 AM       RADIOLOGY:     CT head without contrast   Final Result   Subtle small patchy areas of hypoattenuation in the right middle cerebral   artery territory correlate with areas of acute stroke seen on brain MRI done   02/17/2023. No hemorrhagic conversion.          MRI BRAIN WO CONTRAST   Final Result   Areas of restricted diffusion in the right middle cerebral artery territory involving the insular cortex, right frontal and right parietal lobe   consistent with acute areas of infarct in the right middle cerebral artery   territory. There is a punctate area of restricted diffusion in the medial   aspect of the high left parietal lobe consistent with acute area of infarct   in the left middle cerebral artery territory. An embolic source should be   considered. MRA NECK WO CONTRAST   Final Result   Nonvisualization of the previously identified non flow-limiting focal mid   cervical right internal carotid artery dissection. This may be due to   technical differences. XR CHEST PORTABLE   Final Result   Lungs are clear. Cardiomediastinal silhouette is enlarged. CT HEAD WO CONTRAST   Final Result   1. Hyperdense right middle cerebral artery consistent with thrombus. 2.  No acute intracranial hemorrhage or mass effect. Critical results were called by Dr. Brink Sessions to Dr. Juan Bobby on   2/17/2023 at 00:04. CTA HEAD NECK W CONTRAST   Final Result   Acute right middle cerebral artery M1 segment occlusion. Critical results were called by Dr. Brink Sessions to Dr. Juan Bobby on   2/17/2023 at 00:12. IR ANGIOGRAM CAROTID C EREBRAL BILATERAL    (Results Pending)       Labs and Images reviewed with:    [] Keara Hatfield MD    [] Ayush Young MD  [x] Clive Orellana MD  [] there are no new interval images to review. ASSESSMENT AND PLAN:         ASSESSMENT:     This is a 51 y.o. female with LKW 10:25 PM with acute onset left facial droop, dysarthria, and confusion. CT showing hyperdense right middle cerebral artery consistent with thrombus and acute right middle cerebral artery M1 segment occlusion. Patient received TNK at 12:42 AM and patient subsequently had thrombectomy, then was transferred to neuro ICU for post-thrombolytic monitoring.      Patient care will be discussed with attending, will reevaluate patient along with attending. PLAN/MEDICAL DECISION MAKING:    NEUROLOGIC:  -MRI brain without contrast post thrombectomy: Areas of restricted diffusion in the right MCA territory involving the insular cortex, right frontal and right parietal lobe consistent with acute areas of infarct in the right MCA territory. Punctate area of restricted diffusion in the medial aspect of the high left parietal lobe consistent with acute area of infarct in the left MCA territory. - CT head 24 hours post TNK: Stable with no signs of hemorrhage.  - S/p TNK 12:42 AM  - S/p thrombectomy  - ASA 81 daily  - Goal SBP  per NEV  - Focal ICA dissection noted after thrombectomy per NEV  - Neuro checks per protocol    CARDIOVASCULAR:  - Hx CHF, EF 15-20% per ECHO 8/22, recent admission on 2/15 for CHF exacerbation  - 2D echo with Definity to rule out LV thrombus, pending  - Home meds: Entresto  - Avoid IVF given severe CHF  - ASA 81 mg daily  - Lipitor 80  - Goal SBP  per NEV  - Cardiology following, appreciate recommendation  - Continue telemetry    PULMONARY:  - Maintaining oxygen saturation on RA  - Continuous pulse oximetry  - Incentive spirometry    RENAL/FLUID/ELECTROLYTE:  - BUN 11/ Creatinine 0.91  - Monitor I&O's  - Replace electrolytes PRN  - Daily BMP    GI/NUTRITION:  NUTRITION:  ADULT DIET;  Regular; Low Fat/Low Chol/High Fiber/ISAURA  - Bowel regimen: Glycolax prn  - GI prophylaxis: None - start lovenox > 24 hours after TNK    ID:  - Afebrile   - WBC 8.1  - Continue to monitor for fevers  - Daily CBC    HEME:   - H&H 11.2/35.3  - Platelets 451  - Daily CBC    ENDOCRINE:  - Continue to monitor blood glucose, goal <180    OTHER:  - PT/OT/ST    PROPHYLAXIS:  Stress ulcer: None    DVT PROPHYLAXIS:  - SCD sleeves - Thigh High     DISPOSITION: Stepdown status under neuro critical care team.      Becca North MD  Neuro Critical Care Service   Pager [phone]  2/19/2023     6:09 AM

## 2023-02-19 NOTE — PLAN OF CARE
Problem: Discharge Planning  Goal: Discharge to home or other facility with appropriate resources  2/18/2023 2236 by Darcy Schrader RN  Outcome: Progressing  2/18/2023 1638 by Lisa Mccoy RN  Outcome: Progressing  2/18/2023 1610 by Lisa Mccoy RN  Outcome: Progressing  Flowsheets (Taken 2/18/2023 0800)  Discharge to home or other facility with appropriate resources: Identify barriers to discharge with patient and caregiver     Problem: Safety - Adult  Goal: Free from fall injury  2/18/2023 2236 by Darcy Schrader RN  Outcome: Progressing  2/18/2023 1638 by Lisa Mccoy RN  Outcome: Progressing  2/18/2023 1610 by Lisa Mccoy RN  Outcome: Progressing  Flowsheets (Taken 2/18/2023 0800)  Free From Fall Injury: Instruct family/caregiver on patient safety     Problem: ABCDS Injury Assessment  Goal: Absence of physical injury  2/18/2023 2236 by Darcy Schrader RN  Outcome: Progressing  2/18/2023 1638 by Lisa Mccoy RN  Outcome: Progressing  2/18/2023 1610 by Lisa Mccoy RN  Outcome: Progressing  Flowsheets (Taken 2/18/2023 0800)  Absence of Physical Injury: Implement safety measures based on patient assessment     Problem: Chronic Conditions and Co-morbidities  Goal: Patient's chronic conditions and co-morbidity symptoms are monitored and maintained or improved  2/18/2023 2236 by Darcy Schrader RN  Outcome: Progressing  2/18/2023 1638 by Lisa Mccoy RN  Outcome: Progressing  2/18/2023 1610 by Lisa Mccoy RN  Outcome: Progressing  Flowsheets (Taken 2/18/2023 0800)  Care Plan - Patient's Chronic Conditions and Co-Morbidity Symptoms are Monitored and Maintained or Improved: Monitor and assess patient's chronic conditions and comorbid symptoms for stability, deterioration, or improvement     Problem: Pain  Goal: Verbalizes/displays adequate comfort level or baseline comfort level  Outcome: Progressing

## 2023-02-19 NOTE — PLAN OF CARE
Problem: Discharge Planning  Goal: Discharge to home or other facility with appropriate resources  Outcome: Progressing  Flowsheets (Taken 2/19/2023 0808)  Discharge to home or other facility with appropriate resources:   Identify barriers to discharge with patient and caregiver   Arrange for needed discharge resources and transportation as appropriate     Problem: Safety - Adult  Goal: Free from fall injury  Outcome: Progressing     Problem: ABCDS Injury Assessment  Goal: Absence of physical injury  Outcome: Progressing     Problem: Chronic Conditions and Co-morbidities  Goal: Patient's chronic conditions and co-morbidity symptoms are monitored and maintained or improved  Outcome: Progressing  Flowsheets (Taken 2/19/2023 0808)  Care Plan - Patient's Chronic Conditions and Co-Morbidity Symptoms are Monitored and Maintained or Improved:   Monitor and assess patient's chronic conditions and comorbid symptoms for stability, deterioration, or improvement   Collaborate with multidisciplinary team to address chronic and comorbid conditions and prevent exacerbation or deterioration   Update acute care plan with appropriate goals if chronic or comorbid symptoms are exacerbated and prevent overall improvement and discharge     Problem: Pain  Goal: Verbalizes/displays adequate comfort level or baseline comfort level  Outcome: Progressing

## 2023-02-20 VITALS
TEMPERATURE: 98.5 F | OXYGEN SATURATION: 92 % | SYSTOLIC BLOOD PRESSURE: 107 MMHG | HEIGHT: 69 IN | BODY MASS INDEX: 28.73 KG/M2 | RESPIRATION RATE: 16 BRPM | WEIGHT: 194 LBS | DIASTOLIC BLOOD PRESSURE: 75 MMHG | HEART RATE: 68 BPM

## 2023-02-20 PROBLEM — G81.94 ACUTE LEFT HEMIPARESIS (HCC): Status: ACTIVE | Noted: 2023-02-20

## 2023-02-20 PROBLEM — I63.511 ACUTE ISCHEMIC RIGHT MIDDLE CEREBRAL ARTERY (MCA) STROKE (HCC): Status: ACTIVE | Noted: 2023-02-20

## 2023-02-20 PROBLEM — R47.1 DYSARTHRIA: Status: ACTIVE | Noted: 2023-02-20

## 2023-02-20 PROBLEM — R94.30 CARDIAC LEFT VENTRICULAR EJECTION FRACTION 21-40 PERCENT: Status: ACTIVE | Noted: 2023-02-20

## 2023-02-20 LAB
ABSOLUTE EOS #: 0.76 K/UL (ref 0–0.4)
ABSOLUTE IMMATURE GRANULOCYTE: 0 K/UL (ref 0–0.3)
ABSOLUTE LYMPH #: 3.28 K/UL (ref 1–4.8)
ABSOLUTE MONO #: 0.5 K/UL (ref 0.1–0.8)
ANION GAP SERPL CALCULATED.3IONS-SCNC: 9 MMOL/L (ref 9–17)
BASOPHILS # BLD: 1 % (ref 0–2)
BASOPHILS ABSOLUTE: 0.08 K/UL (ref 0–0.2)
BUN SERPL-MCNC: 14 MG/DL (ref 6–20)
CALCIUM SERPL-MCNC: 9.2 MG/DL (ref 8.6–10.4)
CHLORIDE SERPL-SCNC: 100 MMOL/L (ref 98–107)
CO2 SERPL-SCNC: 22 MMOL/L (ref 20–31)
CREAT SERPL-MCNC: 0.85 MG/DL (ref 0.5–0.9)
EOSINOPHILS RELATIVE PERCENT: 9 % (ref 1–4)
GFR SERPL CREATININE-BSD FRML MDRD: >60 ML/MIN/1.73M2
GLUCOSE SERPL-MCNC: 95 MG/DL (ref 70–99)
HCT VFR BLD AUTO: 35.7 % (ref 36.3–47.1)
HGB BLD-MCNC: 11.5 G/DL (ref 11.9–15.1)
IMMATURE GRANULOCYTES: 0 %
LYMPHOCYTES # BLD: 39 % (ref 24–44)
MCH RBC QN AUTO: 27.9 PG (ref 25.2–33.5)
MCHC RBC AUTO-ENTMCNC: 32.2 G/DL (ref 28.4–34.8)
MCV RBC AUTO: 86.7 FL (ref 82.6–102.9)
MONOCYTES # BLD: 6 % (ref 1–7)
MORPHOLOGY: NORMAL
NRBC AUTOMATED: 0 PER 100 WBC
PDW BLD-RTO: 15.9 % (ref 11.8–14.4)
PLATELET # BLD AUTO: 436 K/UL (ref 138–453)
PMV BLD AUTO: 11.1 FL (ref 8.1–13.5)
POTASSIUM SERPL-SCNC: 4.1 MMOL/L (ref 3.7–5.3)
RBC # BLD: 4.12 M/UL (ref 3.95–5.11)
SEG NEUTROPHILS: 45 % (ref 36–66)
SEGMENTED NEUTROPHILS ABSOLUTE COUNT: 3.78 K/UL (ref 1.8–7.7)
SODIUM SERPL-SCNC: 131 MMOL/L (ref 135–144)
WBC # BLD AUTO: 8.4 K/UL (ref 3.5–11.3)

## 2023-02-20 PROCEDURE — 6370000000 HC RX 637 (ALT 250 FOR IP): Performed by: STUDENT IN AN ORGANIZED HEALTH CARE EDUCATION/TRAINING PROGRAM

## 2023-02-20 PROCEDURE — 99233 SBSQ HOSP IP/OBS HIGH 50: CPT | Performed by: PSYCHIATRY & NEUROLOGY

## 2023-02-20 PROCEDURE — 6360000002 HC RX W HCPCS

## 2023-02-20 PROCEDURE — 6370000000 HC RX 637 (ALT 250 FOR IP): Performed by: NURSE PRACTITIONER

## 2023-02-20 PROCEDURE — 99231 SBSQ HOSP IP/OBS SF/LOW 25: CPT | Performed by: PSYCHIATRY & NEUROLOGY

## 2023-02-20 PROCEDURE — 80048 BASIC METABOLIC PNL TOTAL CA: CPT

## 2023-02-20 PROCEDURE — 2580000003 HC RX 258: Performed by: STUDENT IN AN ORGANIZED HEALTH CARE EDUCATION/TRAINING PROGRAM

## 2023-02-20 PROCEDURE — 36415 COLL VENOUS BLD VENIPUNCTURE: CPT

## 2023-02-20 PROCEDURE — 85025 COMPLETE CBC W/AUTO DIFF WBC: CPT

## 2023-02-20 PROCEDURE — 6370000000 HC RX 637 (ALT 250 FOR IP)

## 2023-02-20 RX ORDER — ATORVASTATIN CALCIUM 80 MG/1
80 TABLET, FILM COATED ORAL NIGHTLY
Qty: 30 TABLET | Refills: 3 | Status: SHIPPED | OUTPATIENT
Start: 2023-02-20

## 2023-02-20 RX ORDER — ASPIRIN 81 MG/1
81 TABLET, CHEWABLE ORAL DAILY
Qty: 30 TABLET | Refills: 3 | Status: SHIPPED | OUTPATIENT
Start: 2023-02-21

## 2023-02-20 RX ADMIN — CARVEDILOL 3.12 MG: 3.12 TABLET, FILM COATED ORAL at 09:16

## 2023-02-20 RX ADMIN — SODIUM CHLORIDE, PRESERVATIVE FREE 10 ML: 5 INJECTION INTRAVENOUS at 09:37

## 2023-02-20 RX ADMIN — SPIRONOLACTONE 25 MG: 25 TABLET ORAL at 09:16

## 2023-02-20 RX ADMIN — ENOXAPARIN SODIUM 40 MG: 100 INJECTION SUBCUTANEOUS at 09:36

## 2023-02-20 RX ADMIN — SACUBITRIL AND VALSARTAN 1 TABLET: 24; 26 TABLET, FILM COATED ORAL at 11:04

## 2023-02-20 RX ADMIN — ASPIRIN 81 MG: 81 TABLET, CHEWABLE ORAL at 09:16

## 2023-02-20 RX ADMIN — FUROSEMIDE 20 MG: 20 TABLET ORAL at 09:16

## 2023-02-20 ASSESSMENT — PAIN SCALES - GENERAL
PAINLEVEL_OUTOF10: 0

## 2023-02-20 NOTE — PROGRESS NOTES
Endovascular Neurosurgery Progress Note    SUBJECTIVE:   No acute events overnight, Patient was anxious this am for MORRIS. Review of Systems:  CONSTITUTIONAL:  negative for fevers, chills, fatigue and malaise    EYES:  negative for double vision, blurred vision and photophobia     HEENT:  negative for tinnitus, epistaxis and sore throat    RESPIRATORY:  negative for cough, shortness of breath, wheezing    CARDIOVASCULAR:  negative for chest pain, palpitations, syncope, edema    GASTROINTESTINAL:  negative for nausea, vomiting    GENITOURINARY:  negative for incontinence    MUSCULOSKELETAL:  negative for neck or back pain    NEUROLOGICAL:  Negative for weakness and tingling  negative for headaches and dizziness    PSYCHIATRIC:  negative for anxiety      Review of systems otherwise negative. OBJECTIVE:     Vitals:    23 0600   BP:    Pulse: 67   Resp: 14   Temp:    SpO2: 98%        General:  Gen: normal habitus, NAD  HEENT: NCAT, mucosa moist  Cvs: RRR, S1 S2 normal  Resp: symmetric unlabored breathing  Abd: s/nd/nt  Ext: no edema  Skin: no lesions seen, warm and dry    Neuro:  Gen: awake and alert, oriented x3. Lang/speech: no aphasia or dysarthria. Follows commands. CN: PERRL, EOMI, VFF, V1-3 intact, mild left face droop, hearing intact, shoulder shrug symmetric, tongue midline  Motor: grossly 5/5 UE and LE b/l  Sense: LT intact in all 4 ext. Coord: FTN and HTS intact b/l  DTR: deferred  Gait: deferred    NIH Stroke Scale:   1a  Level of consciousness: 0 - alert; keenly responsive   1b. LOC questions:  0 - answers both questions correctly   1c. LOC commands: 0 - performs both tasks correctly   2. Best Gaze: 0 - normal   3. Visual: 0 - no visual loss   4. Facial Palsy: 0 - normal symmetric movement   5a. Motor left arm: 0 - no drift, limb holds 90 (or 45) degrees for full 10 seconds   5b. Motor right arm: 0 - no drift, limb holds 90 (or 45) degrees for full 10 seconds   6a.  Motor left le - no drift; leg holds 30 degree position for full 5 seconds   6b  Motor right le - no drift; leg holds 30 degree position for full 5 seconds   7. Limb Ataxia: 0 - absent   8. Sensory: 0 - normal; no sensory loss   9. Best Language:  0 - no aphasia, normal   10. Dysarthria: 0 - normal   11. Extinction and Inattention: 0 - no abnormality         Total:   0     MRS: 0      LABS:   Reviewed. Lab Results   Component Value Date    HGB 11.5 (L) 2023    WBC 8.4 2023     2023     (L) 2023    BUN 14 2023    CREATININE 0.85 2023    APTT 22.6 2023    INR 1.0 2023      No results found for: COVID19    RADIOLOGY:   Images were personally reviewed including:  Please see dictated Radiology note for further details  Right MCA M-2 inferior division occlusion, TICI score 0   The above lesion was treated 8 fr short sheath, BGC, vamsi aspiration x 1, Trevo stent retriever and aspiration through [address] x 1, 2 passes  Final TICI score 03   Non-flow limiting focal dissection in the right mid-cervical ICA       MRA neck on the 23: R ICA cervical segment small dissection    ASSESSMENT:     52 yo woman with CHF EF of 15%, severe mitral regurgitation, p/w acute left face droop and confusion found to have R ICA cervical mild stenosis/dissection, acute R M1 occlusion, s/p TNK and successful thrombectomy  Etiology: possible cardio-embolic in the setting of low EF?  Awaiting MORRIS today   PLAN:   - c/w Aspirin 81mg daily for stroke and small R ICA cervical disection  - if patient is found to have any thrombus, aspirin can be discontinued and replaced with anticoagulant  - consider loop recorder  - can be discharged from endovascular standpoint after MORRIS  - f/u with Dr. Juana Cordero in 2 weeks and Dr Nain Sunshine in 3 months    Case discussed with Dr. Chandra Aguilera MD  Stroke, [facility] Stroke [address] [facility] Stroke [place] [address]  Electronically signed 2/20/2023 at 8:29 AM

## 2023-02-20 NOTE — CARE COORDINATION
Transitional planning:  MORRIS today, but pt was nervous about it. Staff will approach pt about this. Per unit RN, could have ECHO on an outpt. Basis. 1150 Met with pt at bedside, asked if she was having MORRIS completed. She said, \"I'm just going home. \" Given IMM letter, explained and given original. Copy to chart. Aware less then 4 hours before discharge. She is anxious to get her discharge forms and then she will call for her ride.

## 2023-02-20 NOTE — DISCHARGE SUMMARY
Neuro Critical Care   Discharge Summary      PATIENT NAME: Jennifer Paulino  YOB: 1975  MEDICAL RECORD NO. [id number]  DATE: 2/20/2023  PRIMARY CARE PHYSICIAN: No primary care provider on file. DISCHARGE DATE:  2/20/2023  DISCHARGE DIAGNOSIS:   Patient Active Problem List   Diagnosis Code    Cerebrovascular accident aborted by administration of thrombolytic agent [facility]) I63.9    Cerebrovascular accident (CVA) due to occlusion of cerebral artery [facility]) I63.50       Tiara Avalos is a 51 y.o. yo female who presented to [facility] on 2/16/2023 11:38 PM with acute onset of left facial drooping, dysarthria and confusion. Patient's son was present with her when symptoms began at 10:26 PM, he states he noted the left side of her face drooping and patient began drooling and \"talking like a baby\". LKW 10:25 PM. Patient with PMHx CHF (EF 15-20% 8/2022), severe mitral regurgitation, smoker. Recent discharge 2/15/2023 from [facility] for CHF exacerbation. Not on anticoagulation. No prior history of stroke like symptoms. Stroke alert was called on patient arrival, stroke team met patient at [place] [address] directly from the ambulance bay. Initial NIH 6. CT head showing hyperdense right MCA consistent with thrombus. CTA head neck showing acute right middle cerebral artery M1 segment occlusion. Patient received TNKase at 12:42 AM. Patient was determined to be a candidate for thrombectomy and subsequently went for successful thrombectomy with endovascular neurosurgery. During patients stay, Transthoracic Echo showed EF of 20-25%. Endovascular Neurosurgery team wanted patient to receive Transesophageal Echocardiogram. Patient did not want MORRIS at this facility and wanted to follow up with Primary cardiologist, Dr. Brennon Rodriguez. Dr. Carrillo Lassiter office was called and informed. Order for MORRIS faxed to Dr. Carrillo Lassiter office. They will contact patient with time for MORRIS.  Patient to follow up with Dr. Amanda Romero from Endovascular neurosurgery in 2 weeks. Patient to follow up with Dr. Elva Shaffer from Endovascular neurosurgery in 3 months. Patient is to continue taking 81mg Aspirin daily. Labs and imaging were followed daily. At time of discharge, Jovita Mancilla was tolerating a ADULT DIET; Regular, having bowel movements, and is in stable condition to be discharged to home. PHYSICAL EXAMINATION        Discharge Vitals:  height is 5' 9\" (1.753 m) and weight is 194 lb (88 kg). Her oral temperature is 98.5 °F (36.9 °C). Her blood pressure is 107/75 and her pulse is 68. Her respiration is 16 and oxygen saturation is 92%. CONSTITUTIONAL:  Awake, alert and oriented x3. HEAD:  normocephalic, atraumatic    EYES:  PERRLA, EOMI.   ENT:  moist mucous membranes   LUNGS:  Equal air entry bilaterally   CARDIOVASCULAR:  normal s1 / s2   ABDOMEN:  Soft, no rigidity   NECK supple, symmetric, no midline tenderness to palpation    BACK without midline tenderness, step-offs or deformities    EXTREMITIES Normal ROM with no deformities   NEUROLOGIC:  Mental Status:  Alert, oriented x3, no aphasia or dysarthria noted. Cranial Nerves:    II, III: No ptosis, visual fields intact.   III: Pupils:  equal, round, reactive to light  III,IV,VI: Extra Ocular Movements: intact, right gaze preference  VII: Facial strength: Normal  IX: Palate:  intact  XI: Shoulder shrug:  intact     Motor Exam:    Drift:  absent  Tone:  normal     Motor exam is symmetrical 5 out of 5 all extremities bilaterally     Sensory:    Touch:    Right Upper Extremity:  normal  Left Upper Extremity: Normal   Right Lower Extremity:  normal  Left Lower Extremity: Normal      SKIN No obvious ecchymosis, rashes, or lesions      LABS/IMAGING     Recent Labs     02/18/23  0548 02/19/23  0747 02/20/23  0427   WBC 8.1 7.0 8.4   HGB 11.2* 11.3* 11.5*   HCT 35.3* 34.2* 35.7*    279 436   * 134* 131*   K 4.1 4.0 4.1    103 100   CO2 21 22 22   BUN 11 11 14 CREATININE 0.91* 0.76 0.85       CT head without contrast    Result Date: 2/18/2023  EXAMINATION: CT OF THE HEAD WITHOUT CONTRAST  2/18/2023 2:48 am TECHNIQUE: CT of the head was performed without the administration of intravenous contrast. Automated exposure control, iterative reconstruction, and/or weight based adjustment of the mA/kV was utilized to reduce the radiation dose to as low as reasonably achievable. COMPARISON: CT head done 02/16/2023. Brain MRI done 02/17/2023. HISTORY: ORDERING SYSTEM PROVIDED HISTORY: 24 hours post thrombolytic agent TECHNOLOGIST PROVIDED HISTORY: Obtain 24 hours after administration of thrombolytic agent 24 hours post thrombolytic agent Is the patient pregnant?->No Reason for Exam: 24 hour post thrombolytic FINDINGS: BRAIN/VENTRICLES: Subtle small patchy areas of hypoattenuation in the right middle cerebral artery territory correlate with areas of acute stroke seen on brain MRI done 02/17/2023. There is no acute intracranial hemorrhage, mass effect or midline shift. No abnormal extra-axial fluid collection. There is no evidence of hydrocephalus. ORBITS: The visualized portion of the orbits demonstrate no acute abnormality. SINUSES: The visualized paranasal sinuses and mastoid air cells demonstrate no acute abnormality. SOFT TISSUES/SKULL:  No acute abnormality of the visualized skull or soft tissues. Subtle small patchy areas of hypoattenuation in the right middle cerebral artery territory correlate with areas of acute stroke seen on brain MRI done 02/17/2023. No hemorrhagic conversion. CT HEAD WO CONTRAST    Result Date: 2/17/2023  EXAMINATION: CT OF THE HEAD WITHOUT CONTRAST  2/16/2023 11:43 pm TECHNIQUE: CT of the head was performed without the administration of intravenous contrast. Automated exposure control, iterative reconstruction, and/or weight based adjustment of the mA/kV was utilized to reduce the radiation dose to as low as reasonably achievable. COMPARISON: None. HISTORY: ORDERING SYSTEM PROVIDED HISTORY: LKW 1045, L facial droop, dysarthria TECHNOLOGIST PROVIDED HISTORY: [address], L facial droop, dysarthria Decision Support Exception - unselect if not a suspected or confirmed emergency medical condition->Emergency Medical Condition (MA) Is the patient pregnant?->No Reason for Exam: SOV 0429, L facial droop, dysarthria FINDINGS: BRAIN/VENTRICLES: Hyperdense right middle cerebral artery consistent with thrombus. There is no acute intracranial hemorrhage, mass effect or midline shift. No abnormal extra-axial fluid collection. The gray-white differentiation is grossly maintained. There is no evidence of hydrocephalus. ORBITS: The visualized portion of the orbits demonstrate no acute abnormality. SINUSES: The visualized paranasal sinuses and mastoid air cells demonstrate no acute abnormality. SOFT TISSUES/SKULL:  No acute abnormality of the visualized skull or soft tissues. 1.  Hyperdense right middle cerebral artery consistent with thrombus. 2.  No acute intracranial hemorrhage or mass effect. Critical results were called by Dr. Cuba Weber to Dr. Albert Hester on 2/17/2023 at 00:04. XR CHEST PORTABLE    Result Date: 2/17/2023  EXAMINATION: ONE XRAY VIEW OF THE CHEST 2/17/2023 12:26 am COMPARISON: None. HISTORY: ORDERING SYSTEM PROVIDED HISTORY: NIH 2, stroke alert TECHNOLOGIST PROVIDED HISTORY: NIH 2, stroke alert Reason for Exam: upr,cva FINDINGS: Lungs are clear. Cardiomediastinal silhouette is enlarged. No pleural effusion. No pneumothorax. Bony structures are unremarkable. Lungs are clear. Cardiomediastinal silhouette is enlarged. CTA HEAD NECK W CONTRAST    Result Date: 2/17/2023  EXAMINATION: CTA OF THE HEAD AND NECK WITH CONTRAST 2/16/2023 11:43 pm: TECHNIQUE: CTA of the head and neck was performed with the administration of intravenous contrast. Multiplanar reformatted images are provided for review.   MIP images are provided for review. Stenosis of the internal carotid arteries measured using NASCET criteria. Automated exposure control, iterative reconstruction, and/or weight based adjustment of the mA/kV was utilized to reduce the radiation dose to as low as reasonably achievable. COMPARISON: Noncontrast CT head done same day. HISTORY: ORDERING SYSTEM PROVIDED HISTORY: LKW 8978, L facial droop, dysarthria TECHNOLOGIST PROVIDED HISTORY: [address] [facility], L facial droop, dysarthria Decision Support Exception - unselect if not a suspected or confirmed emergency medical condition->Emergency Medical Condition (MA) Reason for Exam: [id number], L facial droop, dysarthria FINDINGS: CTA NECK: AORTIC ARCH/ARCH VESSELS: No dissection or arterial injury. No significant stenosis of the brachiocephalic or subclavian arteries. CAROTID ARTERIES: No dissection, arterial injury, or hemodynamically significant stenosis by NASCET criteria. VERTEBRAL ARTERIES: No dissection, arterial injury, or significant stenosis. SOFT TISSUES: The lung apices are clear. No cervical or superior mediastinal lymphadenopathy. The larynx and pharynx are unremarkable. No acute abnormality of the salivary and thyroid glands. BONES: No acute osseous abnormality. Multilevel degenerative changes in the visualized spine are centered at C5-C6 and C6-C7. CTA HEAD: ANTERIOR CIRCULATION: Acute right middle cerebral artery M1 segment occlusion. There is some filling of the right middle cerebral artery branches through collateral flow. No significant stenosis of the intracranial internal carotid, anterior cerebral, or middle cerebral arteries. No aneurysm. POSTERIOR CIRCULATION: No significant stenosis of the vertebral, basilar, or posterior cerebral arteries. No aneurysm. OTHER: No dural venous sinus thrombosis on this non-dedicated study. BRAIN: No mass effect or midline shift. No extra-axial fluid collection. The gray-white differentiation is maintained.      Acute right middle cerebral artery M1 segment occlusion. Critical results were called by Dr. Cuba Weber to Dr. Palmer Gunter on 2/17/2023 at 00:12. MRA NECK WO CONTRAST    Result Date: 2/17/2023  EXAMINATION: MRA OF THE NECK WITHOUT CONTRAST 2/17/2023 11:31 am TECHNIQUE: Multiplanar multisequence MRA of the neck was performed without the administration of intravenous contrast. Stenosis of the internal carotid arteries measured using NASCET criteria. COMPARISON: CT angiogram neck earlier same day HISTORY: ORDERING SYSTEM PROVIDED HISTORY: post thrombectomy, r/o focal right ICA dissection TECHNOLOGIST PROVIDED HISTORY: FAT SUPPRESSION post thrombectomy, r/o focal right ICA dissection Reason for Exam: post thrombectomy, r/o focal right ICA dissection FINDINGS: AORTIC ARCH/ARCH VESSELS: The origins of the great vessels are not well evaluated on the noncontrast MR angiogram. CAROTID ARTERIES: The previously diagnosis non flow-limiting focal dissection of the mid cervical right internal carotid artery is not clearly evident on the current exam.  Mild irregularity of the mid cervical right internal carotid artery is noted, possibly artifactual.  The left internal carotid artery is unremarkable. VERTEBRAL ARTERIES: There is antegrade flow within the vertebral arteries. There is no significant stenosis identified. Nonvisualization of the previously identified non flow-limiting focal mid cervical right internal carotid artery dissection. This may be due to technical differences.      MRI BRAIN WO CONTRAST    Result Date: 2/17/2023  EXAMINATION: MRI OF THE BRAIN WITHOUT CONTRAST  2/17/2023 11:31 am TECHNIQUE: Multiplanar multisequence MRI of the brain was performed without the administration of intravenous contrast. COMPARISON: None HISTORY: ORDERING SYSTEM PROVIDED HISTORY: eval for acute stroke s/p R MCA thrombectomy TECHNOLOGIST PROVIDED HISTORY: eval for acute stroke s/p R MCA thrombectomy Reason for Exam: eval for acute stroke s/p R MCA thrombectomy Initial evaluation FINDINGS: Limited MRI of the brain was performed using stroke protocol. There are areas of restricted diffusion involving the right middle cerebral artery territory including the right insular cortex, right frontal lobe, and right parietal lobe consistent with acute areas of infarct in the right middle cerebral artery territory. There is no midline shift or mass effect. There is a punctate area of restricted diffusion in the high left parietal lobe consistent with acute area of infarct. An embolic source should be considered. Areas of restricted diffusion in the right middle cerebral artery territory involving the insular cortex, right frontal and right parietal lobe consistent with acute areas of infarct in the right middle cerebral artery territory. There is a punctate area of restricted diffusion in the medial aspect of the high left parietal lobe consistent with acute area of infarct in the left middle cerebral artery territory. An embolic source should be considered.          DISCHARGE INSTRUCTIONS     Discharge Medications:        Medication List        START taking these medications      aspirin 81 MG chewable tablet  Take 1 tablet by mouth daily  Start taking on: February 21, 2023     atorvastatin 80 MG tablet  Commonly known as: LIPITOR  Take 1 tablet by mouth nightly            CONTINUE taking these medications      * albuterol sulfate  (90 Base) MCG/ACT inhaler  Commonly known as: PROVENTIL;VENTOLIN;PROAIR     * albuterol (2.5 MG/3ML) 0.083% nebulizer solution  Commonly known as: PROVENTIL     carvedilol 3.125 MG tablet  Commonly known as: COREG     empagliflozin 10 MG tablet  Commonly known as: JARDIANCE     furosemide 40 MG tablet  Commonly known as: LASIX     ibuprofen 800 MG tablet  Commonly known as: ADVIL;MOTRIN  Take 1 tablet by mouth every 8 hours as needed for Pain     magnesium oxide 400 MG tablet  Commonly known as: MAG-OX     sacubitril-valsartan 24-26 MG per tablet  Commonly known as: ENTRESTO     spironolactone 25 MG tablet  Commonly known as: ALDACTONE           * This list has 2 medication(s) that are the same as other medications prescribed for you. Read the directions carefully, and ask your doctor or other care provider to review them with you. Where to Get Your Medications        These medications were sent to [facility] [address] [facility], [address] [facility] [address]  [address], [address] [place] [address]      Phone: [phone]   aspirin 81 MG chewable tablet  atorvastatin 80 MG tablet       Diet: ADULT DIET; Regular diet as tolerated  Activity: Up as Tolerated  Follow-up:  Follow up with Dr. Marly Ortiz in 2 weeks and Dr. Valerie Collier in 3 months.   Time Spent for discharge: 30 minutes    Bianca Cheek DO  Neuro Critical Care  2/20/2023, 11:40 AM

## 2023-02-20 NOTE — PROGRESS NOTES
CLINICAL PHARMACY NOTE: MEDS TO BEDS    Total # of Prescriptions Filled: 2   The following medications were delivered to the patient:  LIPITOR 80  ASA CHEW     Additional Documentation:

## 2023-02-20 NOTE — PROGRESS NOTES
[facility]  Progress Note                   Date:   2/20/2023  Patient name: Zack Morris  Date of admission:  2/16/2023 11:38 PM  MRN:   [id number]  YOB: 1975  PCP: No primary care provider on file. Reason for Admission: Cerebrovascular accident (CVA) due to occlusion of cerebral artery ([facility] [I63.50]  Cerebrovascular accident aborted by administration of thrombolytic agent ([facility] [I63.9]    Subjective:       Clinical Changes /Abnormalities: Patient seen and examined. Denies chest pain or shortness of breath. Tele/vitals/labs reviewed . Discussed case with RN. Neurology asking for MORRIS yesterday and patient anxious wants to go home    Review of Systems    Medications:   Scheduled Meds:   sacubitril-valsartan  1 tablet Oral Once    enoxaparin  40 mg SubCUTAneous Daily    atorvastatin  80 mg Oral Nightly    sodium chloride flush  5-40 mL IntraVENous 2 times per day    furosemide  20 mg Oral Daily    aspirin  81 mg Oral Daily    carvedilol  3.125 mg Oral BID WC    spironolactone  25 mg Oral Daily     Continuous Infusions:   sodium chloride       CBC:   Recent Labs     02/18/23  0548 02/19/23  0747 02/20/23  0427   WBC 8.1 7.0 8.4   HGB 11.2* 11.3* 11.5*    279 436       BMP:    Recent Labs     02/18/23  0548 02/19/23  0747 02/20/23  0427   * 134* 131*   K 4.1 4.0 4.1    103 100   CO2 21 22 22   BUN 11 11 14   CREATININE 0.91* 0.76 0.85   GLUCOSE 90 89 95       Hepatic:No results for input(s): AST, ALT, ALB, BILITOT, ALKPHOS in the last 72 hours. Troponin:   No results for input(s): TROPHS in the last 72 hours. BNP: No results for input(s): BNP in the last 72 hours. Lipids:   No results for input(s): CHOL, HDL in the last 72 hours. Invalid input(s): LDLCALCU    INR:   No results for input(s): INR in the last 72 hours.       Objective:   Vitals: /75   Pulse 68   Temp 98.5 °F (36.9 °C) (Oral)   Resp 16   Ht 5' 9\" (1.753 m)   Wt 194 lb (88 kg) SpO2 92%   BMI 28.65 kg/m²   General appearance: alert and cooperative with exam  HEENT: Head: Normocephalic, no lesions, without obvious abnormality. Neck:no JVD, trachea midline, no adenopathy  Lungs: Clear to auscultation  Heart: Regular rate and rhythm, s1/s2 auscultated, no murmurs  Abdomen: soft, non-tender, bowel sounds active  Extremities: no edema  Neurologic: not done    ECHO  TTE   08/05/2022:   LVEF 15-20%, severe mitral regurgitation, LVIDd 6.2 cm     TTE   02/22/2021:   LVEF 15-20%, LVIDd 6.5 cm, severe mitral regurgitation, moderate TR    ECHO Summary  Left ventricle is enlarged. Global left ventricular systolic function is  severely reduced. Estimated ejection fraction is 20-25 % . Calculated EF via Menjivar's method is 28 %. Normal left ventricular wall thickness. Moderate to severe mitral regurgitation. Mild tricuspid regurgitation. No tricuspid stenosis. Mild pulmonary hypertension with an estimated right ventricular systolic  pressure of 40 mmHg.      Signature  ----------------------------------------------------------------------------   Electronically signed by Marilin Hernandez(Sonographer) on 02/19/2023   12:45 PM  ----------------------------------------------------------------------------     ----------------------------------------------------------------------------   Electronically signed by Keara Flores(Interpreting physician) on   02/19/2023 12:57 PM  ----------------------------------------------------------------------------     Cardiac catheterization   03/08/2021:   Angiographically normal coronary arteries LVEDP 20-25%        Assessment / Acute Cardiac Problems:   Nonischemic cardiomyopathy EF 15% on GDMT follows with Dr. Ayaan Pompa  Acute embolic stroke affecting right MCA and thrombus in right MCA status post TNKase status post thrombectomy  Hypertension  History of chronic smoking  History of alcoholism    Patient Active Problem List:     Cerebrovascular accident aborted by administration of thrombolytic agent [facility])     Cerebrovascular accident (CVA) due to occlusion of cerebral artery ([address]      Plan of Treatment:   Stable. Follows with Dr. Prasanth Mkcay at [facility] for her [address] with plans for cardiac MRI in several months to reassess  LVEF & valvular disease. Currently euvolemic. Continue GDMTas able given soft BP. Echo reviewed as above -slightly  improved LV function   Continue stroke management per neuro  Case was discussed with neuro attending and patient plan for MORRIS as outpatient with primary cardiology .  Cardiology is signing off , please call us back if needed     Electronically signed by AXEL Diehl NP on 2/20/2023 at 10:43 AM  [address]  [phone]

## 2023-02-20 NOTE — PROGRESS NOTES
Daily Progress Note  Neuro Critical Care    Patient Name: Kuldip Guerrero  Patient : 1975  Room/Bed: 0119/0119-01  Code Status: Full Code  Allergies: Allergies   Allergen Reactions    Penicillins        CHIEF COMPLAINT:      Facial droop, dysarthria     INTERVAL HISTORY    Initial presentation (Admitted 2023): The patient is a 51 y.o. female presented with acute onset of left facial drooping, dysarthria and confusion. Patient's son was present with her when symptoms began at 10:26 PM, he states he noted the left side of her face drooping and patient began drooling and \"talking like a baby\". LKW 10:25 PM. Patient with PMHx CHF (EF 15-20% 2022), severe mitral regurgitation, smoker. Recent discharge 2/15/2023 from [facility] for CHF exacerbation. Not on anticoagulation. No prior history of stroke like symptoms. Stroke alert was called on patient arrival, stroke team met patient at [place] [id number] directly from the ambulance bay. Initial NIH 6. CT head showing hyperdense right MCA consistent with thrombus. CTA head neck showing acute right middle cerebral artery M1 segment occlusion. Patient received TNKase at 12:42 AM. Patient was determined to be a candidate for thrombectomy and subsequently went for thrombectomy with endovascular neurosurgery. Hospital course:     : Remained hemodynamically stable with improvement in neurological exam.  NIH 1. Stroke work-up ongoing. : TTE    Last 24 hours: No acute events overnight.      CURRENT MEDICATIONS:  SCHEDULED MEDICATIONS:   enoxaparin  40 mg SubCUTAneous Daily    atorvastatin  80 mg Oral Nightly    sodium chloride flush  5-40 mL IntraVENous 2 times per day    furosemide  20 mg Oral Daily    aspirin  81 mg Oral Daily    carvedilol  3.125 mg Oral BID WC    spironolactone  25 mg Oral Daily     CONTINUOUS INFUSIONS:   sodium chloride       PRN MEDICATIONS:   polyethylene glycol, sodium chloride flush, sodium chloride, acetaminophen, ondansetron **OR** ondansetron    VITALS:  Temperature Range: Temp: 98.1 °F (36.7 °C) Temp  Av.2 °F (36.8 °C)  Min: 98.1 °F (36.7 °C)  Max: 98.4 °F (36.9 °C)  BP Range: Systolic (54YLY), UZW:226 , Min:90 , LUIGI:472     Diastolic (93MBR), AQY:12, Min:58, Max:78    Pulse Range: Pulse  Av.3  Min: 68  Max: 82  Respiration Range: Resp  Avg: 15.7  Min: 6  Max: 22  Current Pulse Ox: SpO2: 95 %  24HR Pulse Ox Range: SpO2  Av.2 %  Min: 93 %  Max: 98 %  Patient Vitals for the past 12 hrs:   BP Temp Temp src Pulse Resp SpO2   23 0010 -- -- -- -- -- 95 %   23 0000 114/76 98.1 °F (36.7 °C) Oral 68 19 94 %   23 235 -- -- -- -- -- 94 %   23 2300 -- -- -- 70 16 --   23 -- -- -- 74 (!) 9 --   23 -- -- -- 73 20 --   23 -- -- -- 75 18 --   23 -- -- -- -- -- 98 %   23 -- -- -- -- -- 97 %   23 -- -- -- -- -- 98 %   23 107/78 98.4 °F (36.9 °C) Oral 82 18 98 %         RECENT LABS:   Lab Results   Component Value Date    WBC 8.4 2023    HGB 11.5 (L) 2023    HCT 35.7 (L) 2023     2023    CHOL 159 2023    TRIG 131 2023    HDL 39 (L) 2023     (L) 2023    K 4.1 2023     2023    CREATININE 0.85 2023    BUN 14 2023    CO2 22 2023    INR 1.0 2023    LABA1C 5.8 2023     24 HOUR INTAKE/OUTPUT:  Intake/Output Summary (Last 24 hours) at 2023 0603  Last data filed at 2023 1914  Gross per 24 hour   Intake 10 ml   Output 450 ml   Net -440 ml       Labs and Images reviewed with:  [] Dr. Bibiana Guzman. Ayush    [x] Dr. Jessika Pruitt  [] Dr. Arik Pierson  [] There are no new interval images to review. PHYSICAL EXAM     /77   Pulse 67   Temp 98.2 °F (36.8 °C) (Oral)   Resp 14   Ht 5' 9\" (1.753 m)   Wt 194 lb (88 kg)   SpO2 98%   BMI 28.65 kg/m²   CONSTITUTIONAL:  Awake, alert and oriented x3.      HEAD: normocephalic, atraumatic    EYES:  PERRLA, EOMI.   ENT:  moist mucous membranes   LUNGS:  Equal air entry bilaterally   CARDIOVASCULAR:  normal s1 / s2   ABDOMEN:  Soft, no rigidity   NECK supple, symmetric, no midline tenderness to palpation    BACK without midline tenderness, step-offs or deformities    EXTREMITIES Normal ROM with no deformities   NEUROLOGIC:  Mental Status:  Alert, oriented x3, no aphasia or dysarthria noted. Cranial Nerves:    II, III: No ptosis, visual fields intact. III: Pupils:  equal, round, reactive to light  III,IV,VI: Extra Ocular Movements: intact, right gaze preference  VII: Facial strength: Normal  IX: Palate:  intact  XI: Shoulder shrug:  intact     Motor Exam:    Drift:  absent  Tone:  normal     Motor exam is symmetrical 5 out of 5 all extremities bilaterally     Sensory:    Touch:    Right Upper Extremity:  normal  Left Upper Extremity: Normal   Right Lower Extremity:  normal  Left Lower Extremity: Normal      SKIN No obvious ecchymosis, rashes, or lesions      DRAINS:  [x] There are no drains for Neuro Critical Care to monitor at this time.      ASSESSMENT AND PLAN:       NEUROLOGIC:  - MRI brain : R MCA and L MCA diffusion restriction with infarcts  - MRA neck : ICA dissection not visualized  - S/p TNK and thrombectomy  - ASA 81 daily  - Goal SBP  per NEV  - Neuro checks per protocol    CARDIOVASCULAR:  BP Range: Systolic (35UFN), QXV:436 , Min:90 , RBN:011     Diastolic (88YND), YDE:97, Min:58, Max:78    Pulse Range: Pulse  Av.3  Min: 68  Max: 82  - MORRIS today, NPO since midnight  - Hx CHF, EF 20-25%   - Echo   - Aldactone 25mg daily  - Coreg 3.125 mg BID  - ASA 81 mg daily  - Lipitor 80mg nightly  - Goal SBP  per NEV  - Cardiology following, appreciate recommendation  - Continue telemetry    PULMONARY:  Respiration Range: Resp  Avg: 15.7  Min: 6  Max: 22  Current Pulse Ox: SpO2: 95 %  24HR Pulse Ox Range: SpO2  Av.2 %  Min: 93 % Max: 98 %  - Maintaining oxygen saturation on RA  - Continuous pulse oximetry  - Incentive spirometry    RENAL/FLUID/ELECTROLYTE:  - BUN 14/ Creatinine 0.85  - Na 131  - Replace electrolytes PRN  - Daily BMP  - Lasix 20mg daily    GI/NUTRITION:  NUTRITION:  Diet NPO  - Bowel regimen: glycolax PRN  - GI prophylaxis: None, tolerating PO    ID:  Tmax: Temp (24hrs), Av.2 °F (36.8 °C), Min:98.1 °F (36.7 °C), Max:98.4 °F (36.9 °C)    Temperature Range: Temp: 98.1 °F (36.7 °C) Temp  Av.2 °F (36.8 °C)  Min: 98.1 °F (36.7 °C)  Max: 98.4 °F (36.9 °C)  - WBC   Lab Results   Component Value Date    WBC 8.4 2023     - Afebrile  - Continue to monitor for fevers  - Daily CBC    HEME:   Recent Labs     23  0548 23  0747 23  0427   HGB 11.2* 11.3* 11.5*    stable  - Platelets 672  - Daily CBC    ENDOCRINE:  - Continue to monitor blood glucose, goal <180  - glucose controlled - most recent BGL is   Recent Labs     23  0548 23  0747 23  0427   GLUCOSE 90 89 95       OTHER:  - PT/OT/ST  - Code Status: Full Code    PROPHYLAXIS:  DVT PROPHYLAXIS:  - Lovenox 40mg Daily    DISPOSITION:  [] To remain ICU:   [x] OK for out of ICU from Neuro Critical Care standpoint    We will continue to follow along. For any changes in exam or patient status please contact Neuro Critical Care.       Jonas Griffin,   Neuro Critical Care  Pager [phone]  2023     6:03 AM

## 2023-02-27 NOTE — ANESTHESIA POSTPROCEDURE EVALUATION
Department of Anesthesiology  Postprocedure Note    Patient: Viet Luu  MRN: [id number]  YOB: 1975  Date of evaluation: 2/27/2023      Procedure Summary     Date: 02/17/23 Room / Location: [facility] Procedures    Anesthesia Start: 0320 Anesthesia Stop: 4020    Procedure: IR ANGIOGRAM CAROTID CEREBRAL BILATERAL Diagnosis: (emergent thrombectomy)    Scheduled Providers:  Responsible Provider: Ayan Flores MD    Anesthesia Type: MAC ASA Status: 4 - Emergent          Anesthesia Type: No value filed.     Jelena Phase I:      Jelena Phase II:        Anesthesia Post Evaluation    Patient location during evaluation: PACU  Patient participation: complete - patient participated  Level of consciousness: awake and alert  Pain score: 2  Airway patency: patent  Nausea & Vomiting: no nausea and no vomiting  Complications: no  Cardiovascular status: hemodynamically stable  Respiratory status: acceptable  Hydration status: euvolemic

## 2023-07-06 RX ORDER — ATORVASTATIN CALCIUM 80 MG/1
TABLET, FILM COATED ORAL
Qty: 30 TABLET | Refills: 0 | OUTPATIENT
Start: 2023-07-06

## 2023-08-30 RX ORDER — ASPIRIN 81 MG/1
TABLET, CHEWABLE ORAL
Qty: 30 TABLET | Refills: 0 | OUTPATIENT
Start: 2023-08-30

## 2023-08-30 RX ORDER — ATORVASTATIN CALCIUM 80 MG/1
TABLET, FILM COATED ORAL
Qty: 30 TABLET | Refills: 0 | OUTPATIENT
Start: 2023-08-30

## 2024-11-12 ENCOUNTER — HOSPITAL ENCOUNTER (EMERGENCY)
Age: 49
Discharge: HOME | End: 2024-11-13
Attending: EMERGENCY MEDICINE
Payer: MEDICARE

## 2024-11-12 ENCOUNTER — APPOINTMENT (OUTPATIENT)
Dept: GENERAL RADIOLOGY | Age: 49
End: 2024-11-12
Payer: MEDICARE

## 2024-11-12 VITALS
OXYGEN SATURATION: 100 % | DIASTOLIC BLOOD PRESSURE: 95 MMHG | BODY MASS INDEX: 27.28 KG/M2 | RESPIRATION RATE: 16 BRPM | TEMPERATURE: 97.7 F | HEART RATE: 128 BPM | WEIGHT: 180 LBS | SYSTOLIC BLOOD PRESSURE: 135 MMHG | HEIGHT: 68 IN

## 2024-11-12 DIAGNOSIS — S63.259A DISLOCATION OF FINGER, INITIAL ENCOUNTER: Primary | ICD-10-CM

## 2024-11-12 DIAGNOSIS — M79.641 RIGHT HAND PAIN: ICD-10-CM

## 2024-11-12 PROCEDURE — 99283 EMERGENCY DEPT VISIT LOW MDM: CPT

## 2024-11-12 PROCEDURE — 26700 TREAT KNUCKLE DISLOCATION: CPT

## 2024-11-12 ASSESSMENT — LIFESTYLE VARIABLES
HOW MANY STANDARD DRINKS CONTAINING ALCOHOL DO YOU HAVE ON A TYPICAL DAY: PATIENT DOES NOT DRINK
HOW OFTEN DO YOU HAVE A DRINK CONTAINING ALCOHOL: NEVER

## 2024-11-12 ASSESSMENT — PAIN - FUNCTIONAL ASSESSMENT
PAIN_FUNCTIONAL_ASSESSMENT: 0-10
PAIN_FUNCTIONAL_ASSESSMENT: PREVENTS OR INTERFERES WITH ALL ACTIVE AND SOME PASSIVE ACTIVITIES

## 2024-11-12 ASSESSMENT — PAIN DESCRIPTION - DESCRIPTORS: DESCRIPTORS: SORE

## 2024-11-12 ASSESSMENT — PAIN DESCRIPTION - ORIENTATION: ORIENTATION: RIGHT

## 2024-11-12 ASSESSMENT — PAIN DESCRIPTION - LOCATION: LOCATION: HAND

## 2024-11-12 ASSESSMENT — PAIN DESCRIPTION - PAIN TYPE: TYPE: ACUTE PAIN

## 2024-11-12 ASSESSMENT — PAIN SCALES - GENERAL: PAINLEVEL_OUTOF10: 6

## 2024-11-13 ENCOUNTER — APPOINTMENT (OUTPATIENT)
Dept: GENERAL RADIOLOGY | Age: 49
End: 2024-11-13
Payer: MEDICARE

## 2024-11-13 PROCEDURE — 73130 X-RAY EXAM OF HAND: CPT

## 2024-11-13 NOTE — DISCHARGE INSTRUCTIONS
You were seen in the emergency room today for a right finger dislocation.  We performed x-rays as well as reduced the dislocation.  Postprocedure images confirmed that the joint has been properly reduced.  At this time, we recommend that you follow-up with your primary care physician as well as an orthopedic clinic at the contact info we have provided.  Please return to the ED with any loss of sensation or difficulty with movement of your hand.

## 2024-11-13 NOTE — ED PROVIDER NOTES
[facility] ED  Emergency Department Encounter  Emergency Medicine Resident     Pt Name:Jillian Whyte  MRN: [id number]  Birthdate 1975  Date of evaluation: 11/12/24  PCP:  No primary care provider on file.  Note Started: 11:37 PM EST      CHIEF COMPLAINT       Chief Complaint   Patient presents with    Hand Injury     Obvious deformity to right hand       HISTORY OF PRESENT ILLNESS  (Location/Symptom, Timing/Onset, Context/Setting, Quality, Duration, Modifying Factors, Severity.)      Jillian Whyte is a 49 y.o. female who presents with right hand injury.  Patient reports that she was washing dishes in her kitchen roughly an hour prior to presentation, slipped, fell on her hand.  At this time, her chief complaint is hand pain at the medial aspect of her dorsal hand.  Patient denies any loss of consciousness patient reports that she has been under emotional stress as of late secondary to family issues as well as recent diagnosis of CVA.  ROS is negative for headaches, fever, chest pain/tightness, nausea/vomiting, urinary/bowel issues.    PAST MEDICAL / SURGICAL / SOCIAL / FAMILY HISTORY      has no past medical history on file.       has no past surgical history on file.      Social History     Socioeconomic History    Marital status: Single     Spouse name: Not on file    Number of children: Not on file    Years of education: Not on file    Highest education level: Not on file   Occupational History    Not on file   Tobacco Use    Smoking status: Former    Smokeless tobacco: Not on file   Substance and Sexual Activity    Alcohol use: No    Drug use: No    Sexual activity: Not on file   Other Topics Concern    Not on file   Social History Narrative    Not on file     Social Determinants of Health     Financial Resource Strain: Low Risk  (12/11/2020)    Received from [employer]    Overall Financial Resource Strain (CARDIA)     Difficulty of Paying Living Expenses: Not hard at

## 2024-11-13 NOTE — ED PROVIDER NOTES
[facility] ED  eMERGENCY dEPARTMENT eNCOUnter   Attending Attestation     Pt Name: Jillian Whyte  MRN: [id number]  Birthdate 1975  Date of evaluation: 11/13/24       Jillian Whtye is a 49 y.o. female who presents with Hand Injury (Obvious deformity to right hand)      1:00 AM EST      History: Patient presents with hand injury.  Patient fell on the right hand.  Patient has dislocation of the right fifth digit at the PIP    Exam: Deformity of the PIP, normal color distal to this.  Normal sensation.    Plan for x-ray, likely dislocation, will reduce, splint, plan for discharge to follow-up with hand surgery.        I performed a history and physical examination of the patient and discussed management with the resident. I reviewed the resident’s note and agree with the documented findings and plan of care. Any areas of disagreement are noted on the chart. I was personally present for the key portions of any procedures. I have documented in the chart those procedures where I was not present during the key portions. I have personally reviewed all images and agree with the resident's interpretation. I have reviewed the emergency nurses triage note. I agree with the chief complaint, past medical history, past surgical history, allergies, medications, social and family history as documented unless otherwise noted below. Documentation of the HPI, Physical Exam and Medical Decision Making performed by medical students or scribes is based on my personal performance of the HPI, PE and MDM. For Phys Assistant/ Nurse Practitioner cases/documentation I have had a face to face evaluation of this patient and have completed at least one if not all key elements of the E/M (history, physical exam, and MDM). Additional findings are as noted.    For APC cases I have personally evaluated and examined the patient in conjunction with the APC and agree with the treatment plan and disposition of the patient as recorded

## 2024-11-13 NOTE — ED NOTES
Pt to ED via private auto with complaints of right hand injury. Pt states that she slipped while washing her dishes. Pt states she land on her hand. Pt states that she has been drinking tonight. Pt denies any chest pain. Pt denies any shortness of breath.     Pt is A&Ox4, respirations are even. Vitals are complete. Pt appears intoxicated and pt admits to using alcohol.